# Patient Record
Sex: MALE | Race: BLACK OR AFRICAN AMERICAN | NOT HISPANIC OR LATINO | Employment: OTHER | URBAN - METROPOLITAN AREA
[De-identification: names, ages, dates, MRNs, and addresses within clinical notes are randomized per-mention and may not be internally consistent; named-entity substitution may affect disease eponyms.]

---

## 2024-05-06 ENCOUNTER — ANESTHESIA EVENT (OUTPATIENT)
Dept: SURGERY | Facility: OTHER | Age: 74
End: 2024-05-06
Payer: MEDICARE

## 2024-05-06 RX ORDER — LIDOCAINE HYDROCHLORIDE 10 MG/ML
0.5 INJECTION, SOLUTION EPIDURAL; INFILTRATION; INTRACAUDAL; PERINEURAL ONCE
Status: CANCELLED | OUTPATIENT
Start: 2024-05-06 | End: 2024-05-06

## 2024-05-06 RX ORDER — SODIUM CHLORIDE, SODIUM LACTATE, POTASSIUM CHLORIDE, CALCIUM CHLORIDE 600; 310; 30; 20 MG/100ML; MG/100ML; MG/100ML; MG/100ML
INJECTION, SOLUTION INTRAVENOUS CONTINUOUS
Status: CANCELLED | OUTPATIENT
Start: 2024-05-06

## 2024-05-06 RX ORDER — ACETAMINOPHEN 500 MG
1000 TABLET ORAL
Status: CANCELLED | OUTPATIENT
Start: 2024-05-06 | End: 2024-05-06

## 2024-05-09 ENCOUNTER — HOSPITAL ENCOUNTER (OUTPATIENT)
Dept: RADIOLOGY | Facility: OTHER | Age: 74
Discharge: HOME | End: 2024-05-09
Payer: MEDICARE

## 2024-05-09 ENCOUNTER — HOSPITAL ENCOUNTER (OUTPATIENT)
Dept: PREADMISSION TESTING | Facility: OTHER | Age: 74
Discharge: HOME | End: 2024-05-09
Attending: ORTHOPAEDIC SURGERY
Payer: MEDICARE

## 2024-05-09 VITALS
HEIGHT: 73 IN | DIASTOLIC BLOOD PRESSURE: 72 MMHG | SYSTOLIC BLOOD PRESSURE: 155 MMHG | WEIGHT: 195 LBS | TEMPERATURE: 98 F | BODY MASS INDEX: 25.84 KG/M2 | HEART RATE: 61 BPM | OXYGEN SATURATION: 98 % | RESPIRATION RATE: 18 BRPM

## 2024-05-09 DIAGNOSIS — Z01.818 PREOP TESTING: ICD-10-CM

## 2024-05-09 DIAGNOSIS — Z01.818 PRE-OP TESTING: Primary | ICD-10-CM

## 2024-05-09 LAB
ABO + RH BLD: NORMAL
ANION GAP SERPL CALC-SCNC: 11 MMOL/L (ref 8–16)
BASOPHILS # BLD AUTO: 0.03 K/UL (ref 0–0.2)
BASOPHILS NFR BLD: 0.5 % (ref 0–1.9)
BILIRUB UR QL STRIP: NEGATIVE
BLD GP AB SCN CELLS X3 SERPL QL: NORMAL
BUN SERPL-MCNC: 20 MG/DL (ref 8–23)
CALCIUM SERPL-MCNC: 9.3 MG/DL (ref 8.7–10.5)
CHLORIDE SERPL-SCNC: 102 MMOL/L (ref 95–110)
CLARITY UR: CLEAR
CO2 SERPL-SCNC: 22 MMOL/L (ref 23–29)
COLOR UR: YELLOW
CREAT SERPL-MCNC: 1.2 MG/DL (ref 0.5–1.4)
DIFFERENTIAL METHOD BLD: ABNORMAL
EOSINOPHIL # BLD AUTO: 0.1 K/UL (ref 0–0.5)
EOSINOPHIL NFR BLD: 1.2 % (ref 0–8)
ERYTHROCYTE [DISTWIDTH] IN BLOOD BY AUTOMATED COUNT: 14.8 % (ref 11.5–14.5)
EST. GFR  (NO RACE VARIABLE): >60 ML/MIN/1.73 M^2
GLUCOSE SERPL-MCNC: 78 MG/DL (ref 70–110)
GLUCOSE UR QL STRIP: NEGATIVE
HCT VFR BLD AUTO: 45.7 % (ref 40–54)
HGB BLD-MCNC: 14.9 G/DL (ref 14–18)
HGB UR QL STRIP: NEGATIVE
IMM GRANULOCYTES # BLD AUTO: 0.02 K/UL (ref 0–0.04)
IMM GRANULOCYTES NFR BLD AUTO: 0.3 % (ref 0–0.5)
KETONES UR QL STRIP: NEGATIVE
LEUKOCYTE ESTERASE UR QL STRIP: NEGATIVE
LYMPHOCYTES # BLD AUTO: 1.5 K/UL (ref 1–4.8)
LYMPHOCYTES NFR BLD: 23.3 % (ref 18–48)
MCH RBC QN AUTO: 27.8 PG (ref 27–31)
MCHC RBC AUTO-ENTMCNC: 32.6 G/DL (ref 32–36)
MCV RBC AUTO: 85 FL (ref 82–98)
MONOCYTES # BLD AUTO: 0.7 K/UL (ref 0.3–1)
MONOCYTES NFR BLD: 11.2 % (ref 4–15)
NEUTROPHILS # BLD AUTO: 4.2 K/UL (ref 1.8–7.7)
NEUTROPHILS NFR BLD: 63.5 % (ref 38–73)
NITRITE UR QL STRIP: NEGATIVE
NRBC BLD-RTO: 0 /100 WBC
PH UR STRIP: 7 [PH] (ref 5–8)
PLATELET # BLD AUTO: 300 K/UL (ref 150–450)
PMV BLD AUTO: 8.7 FL (ref 9.2–12.9)
POTASSIUM SERPL-SCNC: 3.9 MMOL/L (ref 3.5–5.1)
PROT UR QL STRIP: NEGATIVE
RBC # BLD AUTO: 5.36 M/UL (ref 4.6–6.2)
SODIUM SERPL-SCNC: 135 MMOL/L (ref 136–145)
SP GR UR STRIP: 1.02 (ref 1–1.03)
SPECIMEN OUTDATE: NORMAL
URN SPEC COLLECT METH UR: NORMAL
UROBILINOGEN UR STRIP-ACNC: NEGATIVE EU/DL
WBC # BLD AUTO: 6.6 K/UL (ref 3.9–12.7)

## 2024-05-09 PROCEDURE — 36415 COLL VENOUS BLD VENIPUNCTURE: CPT | Performed by: ORTHOPAEDIC SURGERY

## 2024-05-09 PROCEDURE — 81003 URINALYSIS AUTO W/O SCOPE: CPT | Performed by: ORTHOPAEDIC SURGERY

## 2024-05-09 PROCEDURE — 85025 COMPLETE CBC W/AUTO DIFF WBC: CPT | Performed by: ORTHOPAEDIC SURGERY

## 2024-05-09 PROCEDURE — 80048 BASIC METABOLIC PNL TOTAL CA: CPT | Performed by: ORTHOPAEDIC SURGERY

## 2024-05-09 PROCEDURE — 71046 X-RAY EXAM CHEST 2 VIEWS: CPT | Mod: 26,,, | Performed by: RADIOLOGY

## 2024-05-09 PROCEDURE — 71046 X-RAY EXAM CHEST 2 VIEWS: CPT | Mod: TC,FY

## 2024-05-09 PROCEDURE — 86901 BLOOD TYPING SEROLOGIC RH(D): CPT | Performed by: ORTHOPAEDIC SURGERY

## 2024-05-09 RX ORDER — LACTULOSE 10 G/15ML
10 SOLUTION ORAL; RECTAL 2 TIMES DAILY PRN
COMMUNITY

## 2024-05-09 RX ORDER — GABAPENTIN 300 MG/1
100 CAPSULE ORAL ONCE AS NEEDED
COMMUNITY
Start: 2024-01-02

## 2024-05-09 RX ORDER — LATANOPROST 50 UG/ML
1 SOLUTION/ DROPS OPHTHALMIC DAILY
COMMUNITY

## 2024-05-09 RX ORDER — ATORVASTATIN CALCIUM 20 MG/1
20 TABLET, FILM COATED ORAL DAILY
COMMUNITY

## 2024-05-09 RX ORDER — OXYBUTYNIN CHLORIDE 10 MG/1
10 TABLET, EXTENDED RELEASE ORAL DAILY
COMMUNITY
Start: 2023-08-21 | End: 2024-08-20

## 2024-05-09 RX ORDER — ERGOCALCIFEROL 1.25 MG/1
50000 CAPSULE ORAL ONCE AS NEEDED
COMMUNITY

## 2024-05-09 RX ORDER — FINASTERIDE 5 MG/1
5 TABLET, FILM COATED ORAL DAILY
COMMUNITY

## 2024-05-09 RX ORDER — LOSARTAN POTASSIUM AND HYDROCHLOROTHIAZIDE 100; 25 MG/1; MG/1
1 TABLET, FILM COATED ORAL DAILY
COMMUNITY

## 2024-05-09 NOTE — DISCHARGE INSTRUCTIONS
Information to Prepare you for your Surgery    PRE-ADMIT TESTING -  [phone]    [address]  [facility]          Your surgery has been scheduled at [facility]. We are pleased to have the opportunity to serve you. For Further Information please call [phone].    On the day of surgery please report to the Information Desk on the 1st floor.    CONTACT YOUR PHYSICIAN'S OFFICE THE DAY PRIOR TO YOUR SURGERY TO OBTAIN YOUR ARRIVAL TIME.     The evening before surgery do not eat anything after 9 p.m. ( this includes hard candy, chewing gum and mints).  You may only have GATORADE, POWERADE AND WATER  from 9 p.m. until you leave your home.   DO NOT DRINK ANY LIQUIDS ON THE WAY TO THE HOSPITAL.      Why does your anesthesiologist allow you to drink Gatorade/Powerade before surgery?  Gatorade/Powerade helps to increase your comfort before surgery and to decrease your nausea after surgery. The carbohydrates in Gatorade/Powerade help reduce your body's stress response to surgery.  If you are a diabetic-drink only water prior to surgery.    Outpatient Surgery- May allow 2 adult (18 and older) Support Persons (1 being the designated ) for all surgical/procedural patients. A breastfeeding mother will be allowed her infant and 2 adult Support Persons. No one under the age of 18 will be allowed in the building. No swapping out of visitors in the [facility].      SPECIAL MEDICATION INSTRUCTIONS: TAKE medications checked off by the Anesthesiologist on your Medication List.    Angiogram Patients: Take medications as instructed by your physician, including aspirin.     Surgery Patients:    If you take ASPIRIN - Your PHYSICIAN/SURGEON will need to inform you IF/OR when you need to stop taking aspirin prior to your surgery.     The week prior to surgery do not ot take any medications containing IBUPROFEN or NSAIDS ( Advil, Motrin, Goodys, BC, Aleve, Naproxen etc)  If you are not sure if you should take a medicine please call your surgeon's office.  Ok to take Tylenol    Do Not Wear any make-up (especially eye make-up) to surgery. Please remove any false eyelashes or eyelash extensions. If you arrive the day of surgery with makeup/eyelashes on you will be required to remove prior to surgery. (There is a risk of corneal abrasions if eye makeup/eyelash extensions are not removed)      Leave all valuables at home.   Do Not wear any jewelry or watches, including any metal in body piercings. Jewelry must be removed prior to coming to the hospital.  There is a possibility that rings that are unable to be removed may be cut off if they are on the surgical extremity.    Please remove all hair extensions, wigs, clips and any other metal accessories/ ornaments from your hair.  These items may pose a flammable/fire risk in Surgery and must be removed.    Do not shave your surgical area at least 5 days prior to your surgery. The surgical prep will be performed at the hospital according to Infection Control regulations.    Contact Lens must be removed before surgery. Either do not wear the contact lens or bring a case and solution for storage.  Please bring a container for eyeglasses or dentures as required.  Bring any paperwork your physician has provided, such as consent forms,  history and physicals, doctor's orders, etc.   Bring comfortable clothes that are loose fitting to wear upon discharge. Take into consideration the type of surgery being performed.  Maintain your diet as advised per your physician the day prior to surgery.      Adequate rest the night before surgery is advised.   Park in the Parking lot behind the hospital or in the [facility] across the street from the parking lot. Parking is complimentary.  If you will be discharged the same day as your procedure, please arrange for a responsible adult to drive you home or to accompany you if traveling by taxi.    YOU WILL NOT BE PERMITTED TO DRIVE OR TO LEAVE THE HOSPITAL ALONE AFTER SURGERY.   If you are being discharged the same day, it is strongly recommended that you arrange for someone to remain with you for the first 24 hrs following your surgery.    The Surgeon will speak to your family/visitor after your surgery regarding the outcome of your surgery and post op care.  The Surgeon may speak to you after your surgery, but there is a possibility you may not remember the details.  Please check with your family members regarding the conversation with the Surgeon.    We strongly recommend whoever is bringing you home be present for discharge instructions.  This will ensure a thorough understanding for your post op home care.          Thank you for your cooperation.  The Staff of [facility].            Bathing Instructions with Hibiclens    Shower the evening before and morning of your procedure with Chlorhexidine (Hibiclens)  do not use Chlorhexidine on your face or genitals. Do not get in your eyes.  Wash your face with water and your regular face wash/soap  Use your regular shampoo  Apply Chlorhexidine (Hibiclens) directly on your skin or on a wet washcloth and wash gently. When showering: Move away from the shower stream when applying Chlorhexidine (Hibiclens) to avoid rinsing off too soon.  Rinse thoroughly with warm water  Do not dilute Chlorhexidine (Hibiclens)   Dry off as usual, do not use any deodorant, powder, body lotions, perfume, after shave or cologne.                                     Information to Prepare you for your Surgery    PRE-ADMIT TESTING -  [phone]    [address] [facility]          Your surgery has been scheduled at [facility]. We are pleased to have the opportunity to serve you. For Further Information please call [phone].    On the day of surgery please report to the Information Desk on the 1st floor.    CONTACT YOUR  PHYSICIAN'S OFFICE THE DAY PRIOR TO YOUR SURGERY TO OBTAIN YOUR ARRIVAL TIME.     The evening before surgery do not eat anything after 9 p.m. ( this includes hard candy, chewing gum and mints).  You may only have GATORADE, POWERADE AND WATER  from 9 p.m. until you leave your home.   DO NOT DRINK ANY LIQUIDS ON THE WAY TO THE HOSPITAL.      Why does your anesthesiologist allow you to drink Gatorade/Powerade before surgery?  Gatorade/Powerade helps to increase your comfort before surgery and to decrease your nausea after surgery. The carbohydrates in Gatorade/Powerade help reduce your body's stress response to surgery.  If you are a diabetic-drink only water prior to surgery.    Outpatient Surgery- May allow 2 adult (18 and older) Support Persons (1 being the designated ) for all surgical/procedural patients. A breastfeeding mother will be allowed her infant and 2 adult Support Persons. No one under the age of 18 will be allowed in the building. No swapping out of visitors in the [place] building.      SPECIAL MEDICATION INSTRUCTIONS: TAKE medications checked off by the Anesthesiologist on your Medication List.    Angiogram Patients: Take medications as instructed by your physician, including aspirin.     Surgery Patients:    If you take ASPIRIN - Your PHYSICIAN/SURGEON will need to inform you IF/OR when you need to stop taking aspirin prior to your surgery.     The week prior to surgery do not ot take any medications containing IBUPROFEN or NSAIDS ( Advil, Motrin, Goodys, BC, Aleve, Naproxen etc) If you are not sure if you should take a medicine please call your surgeon's office.  Ok to take Tylenol    Do Not Wear any make-up (especially eye make-up) to surgery. Please remove any false eyelashes or eyelash extensions. If you arrive the day of surgery with makeup/eyelashes on you will be required to remove prior to surgery. (There is a risk of corneal abrasions if eye makeup/eyelash extensions are not  removed)      Leave all valuables at home.   Do Not wear any jewelry or watches, including any metal in body piercings. Jewelry must be removed prior to coming to the hospital.  There is a possibility that rings that are unable to be removed may be cut off if they are on the surgical extremity.    Please remove all hair extensions, wigs, clips and any other metal accessories/ ornaments from your hair.  These items may pose a flammable/fire risk in Surgery and must be removed.    Do not shave your surgical area at least 5 days prior to your surgery. The surgical prep will be performed at the hospital according to Infection Control regulations.    Contact Lens must be removed before surgery. Either do not wear the contact lens or bring a case and solution for storage.  Please bring a container for eyeglasses or dentures as required.  Bring any paperwork your physician has provided, such as consent forms,  history and physicals, doctor's orders, etc.   Bring comfortable clothes that are loose fitting to wear upon discharge. Take into consideration the type of surgery being performed.  Maintain your diet as advised per your physician the day prior to surgery.      Adequate rest the night before surgery is advised.   Park in the Parking lot behind the hospital or in the [place] across the street from the parking lot. Parking is complimentary.  If you will be discharged the same day as your procedure, please arrange for a responsible adult to drive you home or to accompany you if traveling by taxi.   YOU WILL NOT BE PERMITTED TO DRIVE OR TO LEAVE THE HOSPITAL ALONE AFTER SURGERY.   If you are being discharged the same day, it is strongly recommended that you arrange for someone to remain with you for the first 24 hrs following your surgery.    The Surgeon will speak to your family/visitor after your surgery regarding the outcome of your surgery and post op care.  The Surgeon may speak to you after your  surgery, but there is a possibility you may not remember the details.  Please check with your family members regarding the conversation with the Surgeon.    We strongly recommend whoever is bringing you home be present for discharge instructions.  This will ensure a thorough understanding for your post op home care.          Thank you for your cooperation.  The Staff of [facility].            Bathing Instructions with Hibiclens    Shower the evening before and morning of your procedure with Chlorhexidine (Hibiclens)  do not use Chlorhexidine on your face or genitals. Do not get in your eyes.  Wash your face with water and your regular face wash/soap  Use your regular shampoo  Apply Chlorhexidine (Hibiclens) directly on your skin or on a wet washcloth and wash gently. When showering: Move away from the shower stream when applying Chlorhexidine (Hibiclens) to avoid rinsing off too soon.  Rinse thoroughly with warm water  Do not dilute Chlorhexidine (Hibiclens)   Dry off as usual, do not use any deodorant, powder, body lotions, perfume, after shave or cologne.

## 2024-05-09 NOTE — PRE ADMISSION SCREENING
Dr. Curiel reviewed cardiac clearance and outside EKG.   Results placed in folder.  No new orders.

## 2024-05-09 NOTE — ANESTHESIA PREPROCEDURE EVALUATION
05/09/2024  Gage Hdez is a 74 y.o., male.      Pre-op Assessment    I have reviewed the Patient Summary Reports.     I have reviewed the Nursing Notes. I have reviewed the NPO Status.   I have reviewed the Medications.     Review of Systems  Anesthesia Hx:  No problems with previous Anesthesia             Denies Family Hx of Anesthesia complications.    Denies Personal Hx of Anesthesia complications.                    Social:  Non-Smoker       Hematology/Oncology:  Hematology Normal   Oncology Normal                                   EENT/Dental:  EENT/Dental Normal           Cardiovascular:     Hypertension                                        Pulmonary:  Pulmonary Normal                       Renal/:  Renal/ Normal                 Hepatic/GI:  Hepatic/GI Normal                 Musculoskeletal:  Musculoskeletal Normal                Neurological:  Neurology Normal                                      Endocrine:  Endocrine Normal            Dermatological:  Skin Normal    Psych:  Psychiatric Normal                    Physical Exam  General: Well nourished and Alert    Airway:  Mallampati: II   Mouth Opening: Normal  Tongue: Normal    Dental:  Dentures        Anesthesia Plan  Type of Anesthesia, risks & benefits discussed:    Anesthesia Type: Gen ETT  Intra-op Monitoring Plan: Standard ASA Monitors  Post Op Pain Control Plan: multimodal analgesia  Induction:  IV  Airway Plan: Video  Informed Consent: Informed consent signed with the Patient and all parties understand the risks and agree with anesthesia plan.  All questions answered.   ASA Score: 2  Anesthesia Plan Notes: Paper clearance Dr Parks- Paper EKG  Labs pending    Ready For Surgery From Anesthesia Perspective.     .

## 2024-05-16 ENCOUNTER — HOSPITAL ENCOUNTER (OUTPATIENT)
Facility: OTHER | Age: 74
LOS: 1 days | Discharge: HOME | End: 2024-05-20
Attending: ORTHOPAEDIC SURGERY | Admitting: ORTHOPAEDIC SURGERY
Payer: MEDICARE

## 2024-05-16 ENCOUNTER — ANESTHESIA (OUTPATIENT)
Dept: SURGERY | Facility: OTHER | Age: 74
End: 2024-05-16
Payer: MEDICARE

## 2024-05-16 DIAGNOSIS — M51.26 DISPLACEMENT OF LUMBAR INTERVERTEBRAL DISC WITHOUT MYELOPATHY: ICD-10-CM

## 2024-05-16 DIAGNOSIS — Z01.818 PREOP TESTING: Primary | ICD-10-CM

## 2024-05-16 LAB
ANION GAP SERPL CALC-SCNC: 11 MMOL/L (ref 8–16)
APTT PPP: 29.6 SEC (ref 21–32)
BASOPHILS # BLD AUTO: 0.01 K/UL (ref 0–0.2)
BASOPHILS NFR BLD: 0.1 % (ref 0–1.9)
BUN SERPL-MCNC: 13 MG/DL (ref 8–23)
CALCIUM SERPL-MCNC: 9 MG/DL (ref 8.7–10.5)
CHLORIDE SERPL-SCNC: 102 MMOL/L (ref 95–110)
CO2 SERPL-SCNC: 21 MMOL/L (ref 23–29)
CREAT SERPL-MCNC: 1.2 MG/DL (ref 0.5–1.4)
DIFFERENTIAL METHOD BLD: ABNORMAL
EOSINOPHIL # BLD AUTO: 0 K/UL (ref 0–0.5)
EOSINOPHIL NFR BLD: 0 % (ref 0–8)
ERYTHROCYTE [DISTWIDTH] IN BLOOD BY AUTOMATED COUNT: 14.8 % (ref 11.5–14.5)
EST. GFR  (NO RACE VARIABLE): >60 ML/MIN/1.73 M^2
GLUCOSE SERPL-MCNC: 122 MG/DL (ref 70–110)
HCT VFR BLD AUTO: 41.8 % (ref 40–54)
HGB BLD-MCNC: 13.8 G/DL (ref 14–18)
IMM GRANULOCYTES # BLD AUTO: 0.07 K/UL (ref 0–0.04)
IMM GRANULOCYTES NFR BLD AUTO: 0.5 % (ref 0–0.5)
INR PPP: 0.9 (ref 0.8–1.2)
LYMPHOCYTES # BLD AUTO: 0.7 K/UL (ref 1–4.8)
LYMPHOCYTES NFR BLD: 5.8 % (ref 18–48)
MCH RBC QN AUTO: 27.8 PG (ref 27–31)
MCHC RBC AUTO-ENTMCNC: 33 G/DL (ref 32–36)
MCV RBC AUTO: 84 FL (ref 82–98)
MONOCYTES # BLD AUTO: 0.4 K/UL (ref 0.3–1)
MONOCYTES NFR BLD: 2.8 % (ref 4–15)
NEUTROPHILS # BLD AUTO: 11.6 K/UL (ref 1.8–7.7)
NEUTROPHILS NFR BLD: 90.8 % (ref 38–73)
NRBC BLD-RTO: 0 /100 WBC
PLATELET # BLD AUTO: 257 K/UL (ref 150–450)
PMV BLD AUTO: 8.5 FL (ref 9.2–12.9)
POTASSIUM SERPL-SCNC: 3.9 MMOL/L (ref 3.5–5.1)
PROTHROMBIN TIME: 10.7 SEC (ref 9–12.5)
RBC # BLD AUTO: 4.97 M/UL (ref 4.6–6.2)
SODIUM SERPL-SCNC: 134 MMOL/L (ref 136–145)
WBC # BLD AUTO: 12.73 K/UL (ref 3.9–12.7)

## 2024-05-16 PROCEDURE — 85025 COMPLETE CBC W/AUTO DIFF WBC: CPT | Performed by: ORTHOPAEDIC SURGERY

## 2024-05-16 PROCEDURE — 25000003 PHARM REV CODE 250: Performed by: NURSE ANESTHETIST, CERTIFIED REGISTERED

## 2024-05-16 PROCEDURE — 63600175 PHARM REV CODE 636 W HCPCS: Performed by: ANESTHESIOLOGY

## 2024-05-16 PROCEDURE — 36000711: Performed by: ORTHOPAEDIC SURGERY

## 2024-05-16 PROCEDURE — 25000003 PHARM REV CODE 250: Performed by: ORTHOPAEDIC SURGERY

## 2024-05-16 PROCEDURE — 85610 PROTHROMBIN TIME: CPT | Performed by: ORTHOPAEDIC SURGERY

## 2024-05-16 PROCEDURE — 25000003 PHARM REV CODE 250: Performed by: ANESTHESIOLOGY

## 2024-05-16 PROCEDURE — 36415 COLL VENOUS BLD VENIPUNCTURE: CPT | Performed by: ORTHOPAEDIC SURGERY

## 2024-05-16 PROCEDURE — 37000009 HC ANESTHESIA EA ADD 15 MINS: Performed by: ORTHOPAEDIC SURGERY

## 2024-05-16 PROCEDURE — 27201423 OPTIME MED/SURG SUP & DEVICES STERILE SUPPLY: Performed by: ORTHOPAEDIC SURGERY

## 2024-05-16 PROCEDURE — 80048 BASIC METABOLIC PNL TOTAL CA: CPT | Performed by: ORTHOPAEDIC SURGERY

## 2024-05-16 PROCEDURE — 37000008 HC ANESTHESIA 1ST 15 MINUTES: Performed by: ORTHOPAEDIC SURGERY

## 2024-05-16 PROCEDURE — 27800903 OPTIME MED/SURG SUP & DEVICES OTHER IMPLANTS: Performed by: ORTHOPAEDIC SURGERY

## 2024-05-16 PROCEDURE — 71000039 HC RECOVERY, EACH ADD'L HOUR: Performed by: ORTHOPAEDIC SURGERY

## 2024-05-16 PROCEDURE — 63600175 PHARM REV CODE 636 W HCPCS: Performed by: ORTHOPAEDIC SURGERY

## 2024-05-16 PROCEDURE — 71000033 HC RECOVERY, INTIAL HOUR: Performed by: ORTHOPAEDIC SURGERY

## 2024-05-16 PROCEDURE — 85730 THROMBOPLASTIN TIME PARTIAL: CPT | Performed by: ORTHOPAEDIC SURGERY

## 2024-05-16 PROCEDURE — D9220A PRA ANESTHESIA: Mod: AD,P2,ANES, | Performed by: ANESTHESIOLOGY

## 2024-05-16 PROCEDURE — D9220A PRA ANESTHESIA: Mod: CRNA,,, | Performed by: NURSE ANESTHETIST, CERTIFIED REGISTERED

## 2024-05-16 PROCEDURE — C1762 CONN TISS, HUMAN(INC FASCIA): HCPCS | Performed by: ORTHOPAEDIC SURGERY

## 2024-05-16 PROCEDURE — 94799 UNLISTED PULMONARY SVC/PX: CPT

## 2024-05-16 PROCEDURE — C1713 ANCHOR/SCREW BN/BN,TIS/BN: HCPCS | Performed by: ORTHOPAEDIC SURGERY

## 2024-05-16 PROCEDURE — 94761 N-INVAS EAR/PLS OXIMETRY MLT: CPT

## 2024-05-16 PROCEDURE — 36000710: Performed by: ORTHOPAEDIC SURGERY

## 2024-05-16 PROCEDURE — 63600175 PHARM REV CODE 636 W HCPCS: Performed by: NURSE ANESTHETIST, CERTIFIED REGISTERED

## 2024-05-16 DEVICE — IMPLANTABLE DEVICE: Type: IMPLANTABLE DEVICE | Site: SPINE LUMBAR | Status: FUNCTIONAL

## 2024-05-16 RX ORDER — OXYCODONE AND ACETAMINOPHEN 10; 325 MG/1; MG/1
1 TABLET ORAL EVERY 4 HOURS PRN
Status: DISCONTINUED | OUTPATIENT
Start: 2024-05-16 | End: 2024-05-20 | Stop reason: HOSPADM

## 2024-05-16 RX ORDER — METOCLOPRAMIDE HYDROCHLORIDE 5 MG/ML
5 INJECTION INTRAMUSCULAR; INTRAVENOUS EVERY 6 HOURS PRN
Status: DISCONTINUED | OUTPATIENT
Start: 2024-05-16 | End: 2024-05-20 | Stop reason: HOSPADM

## 2024-05-16 RX ORDER — PROPOFOL 10 MG/ML
VIAL (ML) INTRAVENOUS
Status: DISCONTINUED | OUTPATIENT
Start: 2024-05-16 | End: 2024-05-16

## 2024-05-16 RX ORDER — LACTULOSE 10 G/15ML
10 SOLUTION ORAL 2 TIMES DAILY PRN
Status: DISCONTINUED | OUTPATIENT
Start: 2024-05-16 | End: 2024-05-20 | Stop reason: HOSPADM

## 2024-05-16 RX ORDER — OXYCODONE AND ACETAMINOPHEN 10; 325 MG/1; MG/1
2 TABLET ORAL EVERY 4 HOURS PRN
Status: DISCONTINUED | OUTPATIENT
Start: 2024-05-16 | End: 2024-05-20 | Stop reason: HOSPADM

## 2024-05-16 RX ORDER — CYCLOBENZAPRINE HCL 5 MG
10 TABLET ORAL 3 TIMES DAILY PRN
Status: DISCONTINUED | OUTPATIENT
Start: 2024-05-16 | End: 2024-05-20 | Stop reason: HOSPADM

## 2024-05-16 RX ORDER — VANCOMYCIN HYDROCHLORIDE 1 G/20ML
INJECTION, POWDER, LYOPHILIZED, FOR SOLUTION INTRAVENOUS
Status: DISCONTINUED | OUTPATIENT
Start: 2024-05-16 | End: 2024-05-16 | Stop reason: HOSPADM

## 2024-05-16 RX ORDER — SODIUM CHLORIDE, SODIUM LACTATE, POTASSIUM CHLORIDE, CALCIUM CHLORIDE 600; 310; 30; 20 MG/100ML; MG/100ML; MG/100ML; MG/100ML
INJECTION, SOLUTION INTRAVENOUS CONTINUOUS
Status: DISCONTINUED | OUTPATIENT
Start: 2024-05-16 | End: 2024-05-17

## 2024-05-16 RX ORDER — POLYETHYLENE GLYCOL 3350 17 G/17G
17 POWDER, FOR SOLUTION ORAL DAILY
Status: DISCONTINUED | OUTPATIENT
Start: 2024-05-17 | End: 2024-05-20 | Stop reason: HOSPADM

## 2024-05-16 RX ORDER — VECURONIUM BROMIDE FOR INJECTION 1 MG/ML
INJECTION, POWDER, LYOPHILIZED, FOR SOLUTION INTRAVENOUS
Status: DISCONTINUED | OUTPATIENT
Start: 2024-05-16 | End: 2024-05-16

## 2024-05-16 RX ORDER — ROCURONIUM BROMIDE 10 MG/ML
INJECTION, SOLUTION INTRAVENOUS
Status: DISCONTINUED | OUTPATIENT
Start: 2024-05-16 | End: 2024-05-16

## 2024-05-16 RX ORDER — LIDOCAINE HYDROCHLORIDE 20 MG/ML
INJECTION INTRAVENOUS
Status: DISCONTINUED | OUTPATIENT
Start: 2024-05-16 | End: 2024-05-16

## 2024-05-16 RX ORDER — TRANEXAMIC ACID 100 MG/ML
INJECTION, SOLUTION INTRAVENOUS
Status: DISCONTINUED | OUTPATIENT
Start: 2024-05-16 | End: 2024-05-16

## 2024-05-16 RX ORDER — CEFAZOLIN SODIUM 1 G/3ML
2 INJECTION, POWDER, FOR SOLUTION INTRAMUSCULAR; INTRAVENOUS
Status: COMPLETED | OUTPATIENT
Start: 2024-05-16 | End: 2024-05-16

## 2024-05-16 RX ORDER — HYDROMORPHONE HYDROCHLORIDE 2 MG/ML
INJECTION, SOLUTION INTRAMUSCULAR; INTRAVENOUS; SUBCUTANEOUS
Status: DISCONTINUED | OUTPATIENT
Start: 2024-05-16 | End: 2024-05-16

## 2024-05-16 RX ORDER — GABAPENTIN 100 MG/1
100 CAPSULE ORAL ONCE AS NEEDED
Status: DISCONTINUED | OUTPATIENT
Start: 2024-05-16 | End: 2024-05-20 | Stop reason: HOSPADM

## 2024-05-16 RX ORDER — AMOXICILLIN 250 MG
1 CAPSULE ORAL 2 TIMES DAILY
Status: DISCONTINUED | OUTPATIENT
Start: 2024-05-16 | End: 2024-05-20 | Stop reason: HOSPADM

## 2024-05-16 RX ORDER — PHENYLEPHRINE HYDROCHLORIDE 10 MG/ML
INJECTION INTRAVENOUS
Status: DISCONTINUED | OUTPATIENT
Start: 2024-05-16 | End: 2024-05-16

## 2024-05-16 RX ORDER — SODIUM CHLORIDE, SODIUM LACTATE, POTASSIUM CHLORIDE, CALCIUM CHLORIDE 600; 310; 30; 20 MG/100ML; MG/100ML; MG/100ML; MG/100ML
INJECTION, SOLUTION INTRAVENOUS CONTINUOUS
Status: DISCONTINUED | OUTPATIENT
Start: 2024-05-16 | End: 2024-05-16

## 2024-05-16 RX ORDER — FAMOTIDINE 20 MG/1
20 TABLET, FILM COATED ORAL 2 TIMES DAILY
Status: DISCONTINUED | OUTPATIENT
Start: 2024-05-16 | End: 2024-05-20 | Stop reason: HOSPADM

## 2024-05-16 RX ORDER — ONDANSETRON HYDROCHLORIDE 2 MG/ML
INJECTION, SOLUTION INTRAMUSCULAR; INTRAVENOUS
Status: DISCONTINUED | OUTPATIENT
Start: 2024-05-16 | End: 2024-05-16

## 2024-05-16 RX ORDER — MUPIROCIN 20 MG/G
OINTMENT TOPICAL 2 TIMES DAILY
Status: COMPLETED | OUTPATIENT
Start: 2024-05-16 | End: 2024-05-18

## 2024-05-16 RX ORDER — MEPERIDINE HYDROCHLORIDE 25 MG/ML
12.5 INJECTION INTRAMUSCULAR; INTRAVENOUS; SUBCUTANEOUS ONCE AS NEEDED
Status: DISCONTINUED | OUTPATIENT
Start: 2024-05-16 | End: 2024-05-16 | Stop reason: HOSPADM

## 2024-05-16 RX ORDER — OXYCODONE HYDROCHLORIDE 5 MG/1
5 TABLET ORAL
Status: DISCONTINUED | OUTPATIENT
Start: 2024-05-16 | End: 2024-05-16 | Stop reason: HOSPADM

## 2024-05-16 RX ORDER — LOPERAMIDE HYDROCHLORIDE 2 MG/1
4 CAPSULE ORAL ONCE
Status: COMPLETED | OUTPATIENT
Start: 2024-05-16 | End: 2024-05-16

## 2024-05-16 RX ORDER — FINASTERIDE 5 MG/1
5 TABLET, FILM COATED ORAL DAILY
Status: DISCONTINUED | OUTPATIENT
Start: 2024-05-17 | End: 2024-05-20 | Stop reason: HOSPADM

## 2024-05-16 RX ORDER — ONDANSETRON 8 MG/1
8 TABLET, ORALLY DISINTEGRATING ORAL EVERY 8 HOURS PRN
Status: DISCONTINUED | OUTPATIENT
Start: 2024-05-16 | End: 2024-05-20 | Stop reason: HOSPADM

## 2024-05-16 RX ORDER — SODIUM CHLORIDE 0.9 % (FLUSH) 0.9 %
3 SYRINGE (ML) INJECTION
Status: DISCONTINUED | OUTPATIENT
Start: 2024-05-16 | End: 2024-05-16 | Stop reason: HOSPADM

## 2024-05-16 RX ORDER — ACETAMINOPHEN 500 MG
1000 TABLET ORAL
Status: COMPLETED | OUTPATIENT
Start: 2024-05-16 | End: 2024-05-16

## 2024-05-16 RX ORDER — PROCHLORPERAZINE EDISYLATE 5 MG/ML
5 INJECTION INTRAMUSCULAR; INTRAVENOUS EVERY 30 MIN PRN
Status: DISCONTINUED | OUTPATIENT
Start: 2024-05-16 | End: 2024-05-16 | Stop reason: HOSPADM

## 2024-05-16 RX ORDER — DEXAMETHASONE SODIUM PHOSPHATE 4 MG/ML
INJECTION, SOLUTION INTRA-ARTICULAR; INTRALESIONAL; INTRAMUSCULAR; INTRAVENOUS; SOFT TISSUE
Status: DISCONTINUED | OUTPATIENT
Start: 2024-05-16 | End: 2024-05-16

## 2024-05-16 RX ORDER — LATANOPROST 50 UG/ML
1 SOLUTION/ DROPS OPHTHALMIC DAILY
Status: DISCONTINUED | OUTPATIENT
Start: 2024-05-17 | End: 2024-05-20 | Stop reason: HOSPADM

## 2024-05-16 RX ORDER — HYDROMORPHONE HYDROCHLORIDE 2 MG/ML
0.4 INJECTION, SOLUTION INTRAMUSCULAR; INTRAVENOUS; SUBCUTANEOUS EVERY 5 MIN PRN
Status: DISCONTINUED | OUTPATIENT
Start: 2024-05-16 | End: 2024-05-16 | Stop reason: HOSPADM

## 2024-05-16 RX ORDER — LOPERAMIDE HYDROCHLORIDE 2 MG/1
2 CAPSULE ORAL CONTINUOUS PRN
Status: DISCONTINUED | OUTPATIENT
Start: 2024-05-16 | End: 2024-05-20 | Stop reason: HOSPADM

## 2024-05-16 RX ORDER — ATORVASTATIN CALCIUM 20 MG/1
20 TABLET, FILM COATED ORAL DAILY
Status: DISCONTINUED | OUTPATIENT
Start: 2024-05-17 | End: 2024-05-20 | Stop reason: HOSPADM

## 2024-05-16 RX ORDER — PHENYLEPHRINE HYDROCHLORIDE 10 MG/ML
INJECTION INTRAVENOUS CONTINUOUS PRN
Status: DISCONTINUED | OUTPATIENT
Start: 2024-05-16 | End: 2024-05-16

## 2024-05-16 RX ORDER — HYDROMORPHONE HYDROCHLORIDE 2 MG/ML
0.5 INJECTION, SOLUTION INTRAMUSCULAR; INTRAVENOUS; SUBCUTANEOUS
Status: DISCONTINUED | OUTPATIENT
Start: 2024-05-16 | End: 2024-05-20 | Stop reason: HOSPADM

## 2024-05-16 RX ORDER — OXYBUTYNIN CHLORIDE 5 MG/1
10 TABLET, EXTENDED RELEASE ORAL DAILY
Status: DISCONTINUED | OUTPATIENT
Start: 2024-05-17 | End: 2024-05-20 | Stop reason: HOSPADM

## 2024-05-16 RX ORDER — LIDOCAINE HYDROCHLORIDE 10 MG/ML
0.5 INJECTION, SOLUTION EPIDURAL; INFILTRATION; INTRACAUDAL; PERINEURAL ONCE
Status: DISCONTINUED | OUTPATIENT
Start: 2024-05-16 | End: 2024-05-16

## 2024-05-16 RX ORDER — FENTANYL CITRATE 50 UG/ML
INJECTION, SOLUTION INTRAMUSCULAR; INTRAVENOUS
Status: DISCONTINUED | OUTPATIENT
Start: 2024-05-16 | End: 2024-05-16

## 2024-05-16 RX ADMIN — LOPERAMIDE HYDROCHLORIDE 4 MG: 2 CAPSULE ORAL at 08:05

## 2024-05-16 RX ADMIN — SENNOSIDES AND DOCUSATE SODIUM 1 TABLET: 8.6; 5 TABLET ORAL at 08:05

## 2024-05-16 RX ADMIN — TRANEXAMIC ACID 1000 MG: 100 INJECTION, SOLUTION INTRAVENOUS at 12:05

## 2024-05-16 RX ADMIN — CEFAZOLIN 2 G: 2 INJECTION, POWDER, FOR SOLUTION INTRAMUSCULAR; INTRAVENOUS at 08:05

## 2024-05-16 RX ADMIN — GLYCOPYRROLATE 0.2 MG: 0.2 INJECTION, SOLUTION INTRAMUSCULAR; INTRAVITREAL at 12:05

## 2024-05-16 RX ADMIN — CARBOXYMETHYLCELLULOSE SODIUM 4 DROP: 2.5 SOLUTION/ DROPS OPHTHALMIC at 12:05

## 2024-05-16 RX ADMIN — CYCLOBENZAPRINE HYDROCHLORIDE 10 MG: 5 TABLET, FILM COATED ORAL at 08:05

## 2024-05-16 RX ADMIN — TRANEXAMIC ACID 1000 MG: 100 INJECTION, SOLUTION INTRAVENOUS at 01:05

## 2024-05-16 RX ADMIN — LIDOCAINE HYDROCHLORIDE 75 MG: 20 INJECTION, SOLUTION INTRAVENOUS at 12:05

## 2024-05-16 RX ADMIN — ONDANSETRON 4 MG: 2 INJECTION INTRAMUSCULAR; INTRAVENOUS at 03:05

## 2024-05-16 RX ADMIN — VECURONIUM BROMIDE FOR INJECTION 2 MG: 1 INJECTION, POWDER, LYOPHILIZED, FOR SOLUTION INTRAVENOUS at 12:05

## 2024-05-16 RX ADMIN — DEXAMETHASONE SODIUM PHOSPHATE 8 MG: 4 INJECTION, SOLUTION INTRAMUSCULAR; INTRAVENOUS at 12:05

## 2024-05-16 RX ADMIN — MUPIROCIN: 20 OINTMENT TOPICAL at 09:05

## 2024-05-16 RX ADMIN — SODIUM CHLORIDE, SODIUM LACTATE, POTASSIUM CHLORIDE, AND CALCIUM CHLORIDE: 600; 310; 30; 20 INJECTION, SOLUTION INTRAVENOUS at 01:05

## 2024-05-16 RX ADMIN — ACETAMINOPHEN 1000 MG: 500 TABLET ORAL at 10:05

## 2024-05-16 RX ADMIN — OXYCODONE AND ACETAMINOPHEN 1 TABLET: 10; 325 TABLET ORAL at 09:05

## 2024-05-16 RX ADMIN — PHENYLEPHRINE HYDROCHLORIDE 200 MCG: 10 INJECTION INTRAVENOUS at 12:05

## 2024-05-16 RX ADMIN — CEFAZOLIN 2 G: 330 INJECTION, POWDER, FOR SOLUTION INTRAMUSCULAR; INTRAVENOUS at 12:05

## 2024-05-16 RX ADMIN — SODIUM CHLORIDE, POTASSIUM CHLORIDE, SODIUM LACTATE AND CALCIUM CHLORIDE: 600; 310; 30; 20 INJECTION, SOLUTION INTRAVENOUS at 06:05

## 2024-05-16 RX ADMIN — SODIUM CHLORIDE, SODIUM LACTATE, POTASSIUM CHLORIDE, AND CALCIUM CHLORIDE: 600; 310; 30; 20 INJECTION, SOLUTION INTRAVENOUS at 11:05

## 2024-05-16 RX ADMIN — OXYCODONE HYDROCHLORIDE 5 MG: 5 TABLET ORAL at 05:05

## 2024-05-16 RX ADMIN — HYDROMORPHONE HYDROCHLORIDE 0.4 MG: 2 INJECTION INTRAMUSCULAR; INTRAVENOUS; SUBCUTANEOUS at 03:05

## 2024-05-16 RX ADMIN — HYDROMORPHONE HYDROCHLORIDE 0.4 MG: 2 INJECTION INTRAMUSCULAR; INTRAVENOUS; SUBCUTANEOUS at 02:05

## 2024-05-16 RX ADMIN — FAMOTIDINE 20 MG: 20 TABLET ORAL at 08:05

## 2024-05-16 RX ADMIN — PROPOFOL 200 MG: 10 INJECTION, EMULSION INTRAVENOUS at 12:05

## 2024-05-16 RX ADMIN — VECURONIUM BROMIDE FOR INJECTION 3 MG: 1 INJECTION, POWDER, LYOPHILIZED, FOR SOLUTION INTRAVENOUS at 12:05

## 2024-05-16 RX ADMIN — FENTANYL CITRATE 100 MCG: 50 INJECTION, SOLUTION INTRAMUSCULAR; INTRAVENOUS at 12:05

## 2024-05-16 RX ADMIN — PHENYLEPHRINE HYDROCHLORIDE 0.3 MCG/KG/MIN: 10 INJECTION INTRAVENOUS at 12:05

## 2024-05-16 RX ADMIN — SUGAMMADEX 200 MG: 100 INJECTION, SOLUTION INTRAVENOUS at 04:05

## 2024-05-16 RX ADMIN — ROCURONIUM BROMIDE 50 MG: 10 SOLUTION INTRAVENOUS at 12:05

## 2024-05-16 NOTE — TRANSFER OF CARE
Anesthesia Transfer of Care Note    Patient: Gage Hdez    Procedure(s) Performed: Procedure(s) (LRB):  FUSION, SPINE, LUMBAR OPEN POSTERIOR L4-L5 (N/A)    Patient location: PACU    Anesthesia Type: general    Transport from OR: Transported from OR on 6-10 L/min O2 by face mask with adequate spontaneous ventilation    Post pain: adequate analgesia    Post assessment: no apparent anesthetic complications and tolerated procedure well    Post vital signs: stable    Level of consciousness: awake and responds to stimulation    Nausea/Vomiting: no nausea/vomiting    Complications: none    Transfer of care protocol was followed      Last vitals: Visit Vitals  BP (!) 148/62   Temp 36.5 °C (97.7 °F)

## 2024-05-16 NOTE — ANESTHESIA PROCEDURE NOTES
Intubation    Date/Time: 5/16/2024 12:08 PM    Performed by: Chayo Andrade CRNA  Authorized by: Antonio Curiel MD    Intubation:     Induction:  Intravenous    Intubated:  Postinduction    Mask Ventilation:  Easy mask    Attempts:  1    Attempted By:  CRNA    Method of Intubation:  Video laryngoscopy    Blade:  Loomis 3    Laryngeal View Grade: Grade I - full view of cords      Difficult Airway Encountered?: No      Complications:  None    Airway Device:  Oral endotracheal tube    Airway Device Size:  7.5    Style/Cuff Inflation:  Cuffed    Inflation Amount (mL):  4    Tube secured:  21    Secured at:  The lips    Placement Verified By:  Capnometry    Complicating Factors:  None    Findings Post-Intubation:  BS equal bilateral

## 2024-05-16 NOTE — OR NURSING
VSS on 2L NC. Pain is tolerable per pt. Back dressing, CDI. Accordion drain in place. PIV CDI. Meets PACU discharge criteria. Ready for transfer to [address] [place] [address]. Report given to Liliana. Family notified.

## 2024-05-16 NOTE — INTERVAL H&P NOTE
The patient has been examined and the H&P has been reviewed:    I concur with the findings and changes have been noted since the H&P was written: patient CTA and RRR    Anesthesia/Surgery risks, benefits and alternative options discussed and understood by patient/family.          There are no hospital problems to display for this patient.

## 2024-05-16 NOTE — OP NOTE
[facility] ([facility]  Surgery Department  Operative Note    SUMMARY     Date of Procedure: 5/16/2024     Procedure:   1.  Posterior spinal fusion with interbody fusion L4-5  2.  Application of biomechanical intervertebral device L4-5  3.  Nonsegmental instrumentation L4-5  4.  Laminectomy L4  5.  Laminectomy L5  6.  Autograft morselized for spine surgery same incision  7.  Allograft morcellized for spine surgery     Surgeons and Role:     * Maverick Sullivan MD - Primary    Assistant::  Leila Voss p.a.-C, Ms. 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  The site of her surgery was marked by the surgeon consent form was verified.  Preoperative antibiotics were given and SCDs and Aravind hose were placed.  The appropriate anesthesia and neurological monitoring devices were placed.  Patient was then brought to the operating room placed under general endotracheal anesthesia.  A Jin catheter was placed and the appropriate neurological monitoring devices placed.  The patient was then flipped prone onto an open Goran frame with all extremities  appropriately padded and positioned.  The back was then prepped and draped in usual sterile fashion.  A time-out was performed antibiotics were verified as having been given.  Please note the entirety of the procedure was carried out using loupe magnification, bipolar electrocautery, and headlight illumination.  A midline incision was made over the lower portion of the lumbar spine.  It was carried down to the fascia which was incised in line with the skin incision.  The posterior elements were then exposed from L3-L5.  A clamp was placed on the L4 posterior spinous process and the lateral x-ray taken to verify the level.  The transverse processes of L4 and L5 were then exposed the appropriate retractors placed.  Attention was then brought to performing laminectomy.  A Leksell rongeur was used to remove used to remove the posterior spinous process of L4 part of L5 and part of L3.  It was all was used to thin the lamina of L4.  A Kerrison punch was then used to remove the entire mid line laminectomy at the L4 level.  Decompression was then carried out the lateral recess decompressing from the pedicle of L3 to the pedicle of L5 by using osteotomes to perform medial 3rd facetectomies and completing the lateral recess decompression and foraminotomies with Kerrison punches.  A ball-tip probe verified was full and complete decompression.  An extruded fragment of disc was noted to extend upwards behind the body of L4 and this was removed.  On the left side and osteotomy was then made through the pars and the inferior articular facet removed to allow access to the disc space.  The superior articular facet was removed in a piecemeal fashion.  With the nerve root well protected and annulotomy was performed the disc was removed in a piecemeal fashion to the bleeding cortical bone was noted on both sides of the endplates.  Various rasps Davey curette and pituitaries were used for this.  The bone had been removed during the  course of the decompression was stripped of soft tissue and meticulously morselized in a bone mill.  It was combined with the allograft bone.  20 cc of iliac crest aspirate was then obtained with a Jamshidi needle and mixed with the autograft and allograft.  The disc space then had a final placed and was packed fully with autograft and allograft.  The cage was packed with same and placed under direct visualization and expanded under fluoroscopic guidance.  Excellent fit was noted.  Attention was then brought to placement of the pedicle screws.  On the left side of the junction of the transverse process superior articular facet and the pars at the L4 level the bur was used to make a small cortical window.  The lanky probe was then passed down the pedicle and removed.  All 4 walls and the distal floor were palpated and felt to be intact.  The appropriate sized tap was placed and removed and again the walls and floor were palpated and felt to be intact.  The appropriate size screw was then placed.  This was done on the left at L4 and L5 and then on the right at L4 and L5.  All screws stimulated well above the level indicative of any kind of cortical breech and x-ray verified they were in excellent position.  The appropriate size rods were then selected and and end caps placed and compression performed to prevent injection of the cage.  Final tightening of the devices were performed.  The wound was then irrigated copiously.  The high-speed bur was then used to decorticate the remaining posterior elements and bone graft placed posterolaterally to achieve the posterior fusion.  The spinal canal was inspected to assure there was full and complete decompression of the nerve roots at the L4 and L5 levels.  A deep drain was then placed.  This fascia and skin were injected with Exparel and closed using 1 Vicryl suture followed by 2-0 Vicryl suture to close the skin and a running Monocryl stitch.  Steri-Strips and sterile  dressing were then placed.  Patient was placed back into a supine position and general endotracheal anesthesia discontinued.  She was then brought to the recovery room without complication.  All needle, sponge, lap and instrument counts were correct.  There were no complications.    Significant Surgical Tasks Conducted by the Assistant(s), if Applicable: ms Voss was essential for exposure closure visualization and placement of instrumentation throughout the operation    Estimated Blood Loss (EBL): * No values recorded between 5/16/2024 12:31 PM and 5/16/2024  4:13 PM *           Implants:   Implant Name Type Inv. Item Serial No.  Lot No. LRB No. Used Action   [id number]   [id number] Spinal Elements [id number]  1 Implanted   [id number]   [id number] Spinal Elements [id number]  1 Implanted   6.5X45MM SCREW    [employer]  N/A 4 Implanted   SET SCREW    [employer]  N/A 4 Implanted   9MM LUCENT XPAND    Spinal Elements  N/A 1 Implanted   50MM SHAYLEE      N/A 2 Implanted       Specimens:   Specimen (24h ago, onward)      None                    Condition: Good    Disposition: PACU - hemodynamically stable.    Attestation: Op Note Attestation: I was physically present and scrubbed for the entire procedure.

## 2024-05-17 LAB
BASOPHILS # BLD AUTO: 0.02 K/UL (ref 0–0.2)
BASOPHILS NFR BLD: 0.2 % (ref 0–1.9)
DIFFERENTIAL METHOD BLD: ABNORMAL
EOSINOPHIL # BLD AUTO: 0 K/UL (ref 0–0.5)
EOSINOPHIL NFR BLD: 0 % (ref 0–8)
ERYTHROCYTE [DISTWIDTH] IN BLOOD BY AUTOMATED COUNT: 14.8 % (ref 11.5–14.5)
HCT VFR BLD AUTO: 39.2 % (ref 40–54)
HGB BLD-MCNC: 12.8 G/DL (ref 14–18)
IMM GRANULOCYTES # BLD AUTO: 0.05 K/UL (ref 0–0.04)
IMM GRANULOCYTES NFR BLD AUTO: 0.4 % (ref 0–0.5)
LYMPHOCYTES # BLD AUTO: 1.3 K/UL (ref 1–4.8)
LYMPHOCYTES NFR BLD: 10.2 % (ref 18–48)
MCH RBC QN AUTO: 27.8 PG (ref 27–31)
MCHC RBC AUTO-ENTMCNC: 32.7 G/DL (ref 32–36)
MCV RBC AUTO: 85 FL (ref 82–98)
MONOCYTES # BLD AUTO: 1.2 K/UL (ref 0.3–1)
MONOCYTES NFR BLD: 9.2 % (ref 4–15)
NEUTROPHILS # BLD AUTO: 10.1 K/UL (ref 1.8–7.7)
NEUTROPHILS NFR BLD: 80 % (ref 38–73)
NRBC BLD-RTO: 0 /100 WBC
PLATELET # BLD AUTO: 260 K/UL (ref 150–450)
PMV BLD AUTO: 9 FL (ref 9.2–12.9)
RBC # BLD AUTO: 4.6 M/UL (ref 4.6–6.2)
WBC # BLD AUTO: 12.64 K/UL (ref 3.9–12.7)

## 2024-05-17 PROCEDURE — 63600175 PHARM REV CODE 636 W HCPCS: Performed by: ORTHOPAEDIC SURGERY

## 2024-05-17 PROCEDURE — 99900035 HC TECH TIME PER 15 MIN (STAT)

## 2024-05-17 PROCEDURE — 97162 PT EVAL MOD COMPLEX 30 MIN: CPT

## 2024-05-17 PROCEDURE — 36415 COLL VENOUS BLD VENIPUNCTURE: CPT | Performed by: ORTHOPAEDIC SURGERY

## 2024-05-17 PROCEDURE — 97530 THERAPEUTIC ACTIVITIES: CPT

## 2024-05-17 PROCEDURE — 85025 COMPLETE CBC W/AUTO DIFF WBC: CPT | Performed by: ORTHOPAEDIC SURGERY

## 2024-05-17 PROCEDURE — 25000003 PHARM REV CODE 250: Performed by: ORTHOPAEDIC SURGERY

## 2024-05-17 RX ORDER — OXYCODONE AND ACETAMINOPHEN 10; 325 MG/1; MG/1
1 TABLET ORAL EVERY 4 HOURS PRN
Qty: 60 TABLET | Refills: 0 | Status: SHIPPED | OUTPATIENT
Start: 2024-05-17

## 2024-05-17 RX ORDER — CYCLOBENZAPRINE HCL 10 MG
10 TABLET ORAL 3 TIMES DAILY PRN
Qty: 30 TABLET | Refills: 0 | Status: SHIPPED | OUTPATIENT
Start: 2024-05-17 | End: 2024-05-27

## 2024-05-17 RX ADMIN — SODIUM CHLORIDE, POTASSIUM CHLORIDE, SODIUM LACTATE AND CALCIUM CHLORIDE: 600; 310; 30; 20 INJECTION, SOLUTION INTRAVENOUS at 04:05

## 2024-05-17 RX ADMIN — POLYETHYLENE GLYCOL 3350 17 G: 17 POWDER, FOR SOLUTION ORAL at 09:05

## 2024-05-17 RX ADMIN — OXYCODONE AND ACETAMINOPHEN 2 TABLET: 10; 325 TABLET ORAL at 04:05

## 2024-05-17 RX ADMIN — SENNOSIDES AND DOCUSATE SODIUM 1 TABLET: 8.6; 5 TABLET ORAL at 09:05

## 2024-05-17 RX ADMIN — OXYCODONE AND ACETAMINOPHEN 1 TABLET: 10; 325 TABLET ORAL at 11:05

## 2024-05-17 RX ADMIN — ATORVASTATIN CALCIUM 20 MG: 20 TABLET, FILM COATED ORAL at 09:05

## 2024-05-17 RX ADMIN — LATANOPROST 1 DROP: 50 SOLUTION OPHTHALMIC at 09:05

## 2024-05-17 RX ADMIN — OXYBUTYNIN CHLORIDE 10 MG: 5 TABLET, EXTENDED RELEASE ORAL at 09:05

## 2024-05-17 RX ADMIN — FAMOTIDINE 20 MG: 20 TABLET ORAL at 09:05

## 2024-05-17 RX ADMIN — CYCLOBENZAPRINE HYDROCHLORIDE 10 MG: 5 TABLET, FILM COATED ORAL at 06:05

## 2024-05-17 RX ADMIN — HYDROMORPHONE HYDROCHLORIDE 0.5 MG: 2 INJECTION INTRAMUSCULAR; INTRAVENOUS; SUBCUTANEOUS at 02:05

## 2024-05-17 RX ADMIN — MUPIROCIN: 20 OINTMENT TOPICAL at 09:05

## 2024-05-17 RX ADMIN — OXYCODONE AND ACETAMINOPHEN 1 TABLET: 10; 325 TABLET ORAL at 06:05

## 2024-05-17 RX ADMIN — CEFAZOLIN 2 G: 2 INJECTION, POWDER, FOR SOLUTION INTRAMUSCULAR; INTRAVENOUS at 04:05

## 2024-05-17 RX ADMIN — FINASTERIDE 5 MG: 5 TABLET, FILM COATED ORAL at 09:05

## 2024-05-17 NOTE — PT/OT/SLP PROGRESS
Physical Therapy Treatment    Patient Name:  Gage Hdez   MRN:  [id number]    Recommendations:     Discharge Recommendations: Low Intensity Therapy (pending progress with gait)  Discharge Equipment Recommendations: walker, rolling  Barriers to discharge:  Pain and mobility deficits     Assessment:     Gage Hdez is a 74 y.o. male admitted with a medical diagnosis of s/p lumbar fusion.  He presents with the following impairments/functional limitations: weakness, impaired endurance, impaired functional mobility, impaired balance, gait instability, decreased lower extremity function, pain, decreased ROM.    Pt pre-medicated for BID session but required significantly more assist with standing and gait trial with RW. Pt tolerated OOB x 1 1/2 hours today but unable to progress with gait training; may have to consider post-acute placement if unable to ambulate household distance.     Rehab Prognosis: Good; patient would benefit from acute skilled PT services to address these deficits and reach maximum level of function.    Recent Surgery: Procedure(s) (LRB):  FUSION, SPINE, LUMBAR OPEN POSTERIOR L4-L5 (N/A) 1 Day Post-Op    Plan:     During this hospitalization, patient to be seen BID to address the identified rehab impairments via therapeutic activities, gait training, therapeutic exercises, neuromuscular re-education and progress toward the following goals:    Plan of Care Expires:  05/31/24    Subjective     Chief Complaint: Pt reports he received Dilaudid and that he feels better but still hurting  Patient/Family Comments/goals: Pt's spouse at bedside, encouraging pt to participate with therapy, pt agreeable  Pain/Comfort:  Pain Rating 1: 7/10  Location 1: back  Pain Addressed 1: Pre-medicate for activity, Reposition, Cessation of Activity      Objective:     Communicated with BRENDON Rabago prior to session.  Patient found up in chair with peripheral IV, Other (comments)- accordion drain upon PT entry to room.     General  Precautions: Standard, fall  Orthopedic Precautions: spinal precautions  Braces: N/A  Respiratory Status: Room air     Functional Mobility:  Bed Mobility:     Scooting: moderate assistance  Sit to Supine: minimum assistance  Transfers:     Sit to Stand:  3 attempts with RW, successful 2/3 trials. Pt required Max A to stand from chair with cueing for hand placement. Pt with significant difficulty extending B LE to come to upright standing    Gait: 5' Max A with RW, pt ambulating with kyphotic posture and difficulty maintaining knee extension   Balance: Poor dynamic standing balance with RW, legs shaky in standing       AM-PAC 6 CLICK MOBILITY  Turning over in bed (including adjusting bedclothes, sheets and blankets)?: 3  Sitting down on and standing up from a chair with arms (e.g., wheelchair, bedside commode, etc.): 2  Moving from lying on back to sitting on the side of the bed?: 2  Moving to and from a bed to a chair (including a wheelchair)?: 2  Need to walk in hospital room?: 2  Climbing 3-5 steps with a railing?: 1  Basic Mobility Total Score: 12       Treatment & Education:  Pt and wife educated on role of PT, POC, reviewed log rolling, explained expectations/mobility goals to meet prior to clearance to D/C home.     Patient left supine with all lines intact, RN notified, and wife present..    GOALS:   Multidisciplinary Problems       Physical Therapy Goals          Problem: Physical Therapy    Goal Priority Disciplines Outcome Goal Variances Interventions   Physical Therapy Goal     PT, PT/OT Progressing     Description: PT goals to be met in 2 weeks as follows:   1. Mod I Bed mobility  2. Mod I T/F  3. Gait 150' Mod I with LRAD  4. Tolerate OOB x 2 hours                       Time Tracking:     PT Received On: 05/17/24  PT Start Time: 1450     PT Stop Time: 1506  PT Total Time (min): 16 min     Billable Minutes: Therapeutic Activity 13    Treatment Type: Treatment  PT/PTA: PT     Number of PTA visits since  last PT visit: 0     05/17/2024

## 2024-05-17 NOTE — ANESTHESIA POSTPROCEDURE EVALUATION
Anesthesia Post Evaluation    Patient: Gage Hdez    Procedure(s) Performed: Procedure(s) (LRB):  FUSION, SPINE, LUMBAR OPEN POSTERIOR L4-L5 (N/A)    Final Anesthesia Type: general      Patient location during evaluation: PACU  Patient participation: Yes- Able to Participate  Level of consciousness: awake and alert  Post-procedure vital signs: reviewed and stable  Pain management: adequate  Airway patency: patent  DAVID mitigation strategies: Extubation while patient is awake  PONV status at discharge: No PONV  Anesthetic complications: no      Cardiovascular status: hemodynamically stable  Respiratory status: unassisted  Hydration status: euvolemic  Follow-up not needed.              Vitals Value Taken Time   /80 05/16/24 1935   Temp 36.4 °C (97.6 °F) 05/16/24 1935   Pulse 66 05/16/24 1935   Resp 18 05/16/24 1935   SpO2 94 % 05/16/24 1935         Event Time   Out of Recovery 18:10:00         Pain/Adam Score: Pain Rating Prior to Med Admin: 4 (5/16/2024  5:09 PM)  Adam Score: 8 (5/16/2024  5:54 PM)

## 2024-05-17 NOTE — PLAN OF CARE
LMSW met with the patient at the bedside. Patient is alert and oriented with no communication barriers. Prior to admission, the patient is independent. Patient denies the use of HH. Patient has a cane. Patients PCP is Khushboo Bell at [facility].  Patient choice pharmacy is bedside. Patients' family will transport the patient home at discharge.     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]  Preop testing [Z01.818]    Admission Date: 5/16/2024  Expected Discharge Date: 5/19/2024    Transition of Care Barriers: (P) None    Payor: BCBS [place] MEDICARE / Plan: [employer] [place] / Product Type: Medicare Advantage /     Extended Emergency Contact Information  Primary Emergency Contact: Yancy Hdez  Address: [facility] [address] [place] [address] [facility]  Home Phone: [phone]  Work Phone: [phone]  Mobile Phone: [phone]  Relation: Spouse    Discharge Plan A: (P) Home, Home with family  Discharge Plan B: (P) Home Health      [id number] [address] AT [address] [address]  Phone: [phone] Fax: [phone]      Initial Assessment (most recent)       Adult Discharge Assessment - 05/17/24 0854          Discharge Assessment    Assessment Type Discharge Planning Assessment     Confirmed/corrected address, phone number and insurance Yes     Confirmed Demographics Correct on Facesheet     Source of Information patient;family;health record     People in Home spouse     Do you expect to return to your current living situation? Yes     Do you have help at home or  someone to help you manage your care at home? Yes     Prior to hospitilization cognitive status: Alert/Oriented;No Deficits     Current cognitive status: Alert/Oriented;No Deficits     Equipment Currently Used at Home cane, straight     Readmission within 30 days? No (P)      Patient currently being followed by outpatient case management? No (P)      Do you currently have service(s) that help you manage your care at home? No (P)      Do you take prescription medications? Yes (P)      Do you have prescription coverage? Yes (P)      Do you have any problems affording any of your prescribed medications? No (P)      Is the patient taking medications as prescribed? yes (P)      How do you get to doctors appointments? family or friend will provide;car, drives self (P)      Are you on dialysis? No (P)      Do you take coumadin? No (P)      Discharge Plan A Home;Home with family (P)      Discharge Plan B Home Health (P)      Discharge Plan discussed with: Patient (P)      Transition of Care Barriers None (P)

## 2024-05-17 NOTE — PLAN OF CARE
Problem: Adult Inpatient Plan of Care  Goal: Plan of Care Review  Outcome: Progressing  Goal: Patient-Specific Goal (Individualized)  Outcome: Progressing  Goal: Absence of Hospital-Acquired Illness or Injury  Outcome: Progressing  Goal: Optimal Comfort and Wellbeing  Outcome: Progressing  Goal: Readiness for Transition of Care  Outcome: Progressing     Problem: Wound  Goal: Optimal Coping  Outcome: Progressing  Goal: Optimal Functional Ability  Outcome: Progressing  Goal: Absence of Infection Signs and Symptoms  Outcome: Progressing  Goal: Improved Oral Intake  Outcome: Progressing  Goal: Optimal Pain Control and Function  Outcome: Progressing  Goal: Skin Health and Integrity  Outcome: Progressing  Goal: Optimal Wound Healing  Outcome: Progressing     Problem: Fall Injury Risk  Goal: Absence of Fall and Fall-Related Injury  Outcome: Progressing     Problem: Infection  Goal: Absence of Infection Signs and Symptoms  Outcome: Progressing

## 2024-05-17 NOTE — PT/OT/SLP EVAL
"Physical Therapy Evaluation    Patient Name:  Gage Hdez   MRN:  [id number]    Recommendations:     Discharge Recommendations: Low Intensity Therapy pending progress with gait  Discharge Equipment Recommendations: walker, rolling   Barriers to discharge:  Pain and mobility deficits    Assessment:     Gage Hdez is a 74 y.o. male admitted with a medical diagnosis of s/p lumbar fusion.  He presents with the following impairments/functional limitations: weakness, gait instability, impaired balance, impaired functional mobility, decreased lower extremity function, pain, decreased ROM.    Pt evaluated, able to complete OOB to chair t/f with RW but c/o significant low back incisional pain, reports not taking pain meds today. Pt would benefit from continued PT services to address post-op mobility deficits and increase functional independence. Plan for BID session today to work on increasing gait distance once pre-medicated.      The mobility limitation cannot be sufficiently resolved by the use of a cane.   Patient's functional mobility deficit can be sufficiently resolved with the use of a rolling walker. Patient's mobility limitation significantly impairs their ability to participate in one of more activities of daily living. The use of a rolling walker will significantly improve the patient's ability to participate in MRADLS and the patient will use it on regular basis in the home.     Rehab Prognosis: Good; patient would benefit from acute skilled PT services to address these deficits and reach maximum level of function.    Recent Surgery: Procedure(s) (LRB):  FUSION, SPINE, LUMBAR OPEN POSTERIOR L4-L5 (N/A) 1 Day Post-Op    Plan:     During this hospitalization, patient to be seen BID to address the identified rehab impairments via gait training, therapeutic activities, therapeutic exercises and progress toward the following goals:    Plan of Care Expires:  05/31/24    Subjective     Chief Complaint: "I haven't " "taken any pain medicine today. The percocet last night did not help. Now that I am going to get up it is probably going to hurt"  Patient/Family Comments/goals: Pt agreeable to PT eval   Pain/Comfort:  Pain Rating 1: 6/10  Location 1: back  Pain Addressed 1: Pre-medicate for activity, Cessation of Activity    Patients cultural, spiritual, Gnosticism conflicts given the current situation: no    Living Environment:  [facility] with 3 MATEO, has a ramp  Prior to admission, patients level of function was I with mobility household level, Mod I with small based quad cane for longer distances.  Equipment used at home: cane, quad.  DME owned (not currently used): shower chair.  Upon discharge, patient will have assistance from lives with wife.    Objective:     Communicated with called RN Gem prior to session.  Patient found supine with SCD, peripheral IV, Other (comments)- accordion drain  upon PT entry to room.    General Precautions: Standard, fall  Orthopedic Precautions:spinal precautions   Braces: N/A  Respiratory Status: Room air    Exams:  Sensation- Intact light touch, pt reports radicular symptoms he experienced pre-op have improved  B LE ROM WFL except limited R hip flexion 2/2 pain   B LE MMT 4+/5 except R hip flexion 3/5    Functional Mobility:  Bed Mobility- Rolling R Mod A, Mod A supine to sit and scooting to HOB, required cueing for log roll technique  Transfers- Sit>stand Mod A with bed height elevated, Stand>sit Min A to control descent into chair with RW  Gait- 6' Min A with RW to bedside chair, antalgic gait with increased c/o pain in standing      AM-PAC 6 CLICK MOBILITY  Total Score:13       Treatment & Education:  Pt educated on role of PT, POC, and mobility training. Issued handout on spinal precautions    Patient left up in chair with all lines intact, call button in reach, and RN notified.    GOALS:   Multidisciplinary Problems       Physical Therapy Goals          Problem: Physical Therapy    Goal " Priority Disciplines Outcome Goal Variances Interventions   Physical Therapy Goal     PT, PT/OT Progressing     Description: PT goals to be met in 2 weeks as follows:   1. Mod I Bed mobility  2. Mod I T/F  3. Gait 150' Mod I with LRAD  4. Tolerate OOB x 2 hours                       History:     Past Medical History:   Diagnosis Date    Essential (primary) hypertension     Heart murmur        Past Surgical History:   Procedure Laterality Date    TRANSURETHRAL RESECTION OF PROSTATE      PER PT FOR ED       Time Tracking:     PT Received On: 05/17/24  PT Start Time: 1306     PT Stop Time: 1326  PT Total Time (min): 20 min     Billable Minutes: Evaluation 15      05/17/2024

## 2024-05-17 NOTE — PROGRESS NOTES
Afebrile ramirez  L signs stable   Pain is controlled   Bilateral lower extremities neurovascular intact   Drainage  greater than 100   Hemoglobin 13.8   Status post lumbar fusion     PT   Pain control   SCDs and Aravind's   Possible discharge  tomorrow

## 2024-05-17 NOTE — PLAN OF CARE
D/C Rec: Low Intensity Therapy pending progress with gait  DME Rec: Rolling walker    Pt evaluated, able to complete OOB to chair t/f with RW but c/o significant low back incisional pain, reports not taking pain meds today. Pt would benefit from continued PT services to address post-op mobility deficits and increase functional independence. Plan for BID session today to work on increasing gait distance once pre-medicated.      Problem: Physical Therapy  Goal: Physical Therapy Goal  Description: PT goals to be met in 2 weeks as follows:   1. Mod I Bed mobility  2. Mod I T/F  3. Gait 150' Mod I with LRAD  4. Tolerate OOB x 2 hours  Outcome: Progressing

## 2024-05-17 NOTE — PLAN OF CARE
I certify I provided patient choice and a list to the patient/family of [facility].  Patient/Family signed Patient's Choice Disclosure Form choosing the following  1.First available   [facility] patient.   Patient is agreeable to a RW. Sw requested patient be reviewed for a RW.

## 2024-05-17 NOTE — PLAN OF CARE
[facility] ([address])      HOME HEALTH ORDERS  FACE TO FACE ENCOUNTER    Patient Name: Gage Hdez  YOB: 1950    PCP: No, Primary Doctor   PCP Address: None  PCP Phone Number: None  PCP Fax: None    Encounter Date: 4/25/24    Admit to Home Health    Diagnoses:  There are no hospital problems to display for this patient.      Follow Up Appointments:  No future appointments.    Allergies:Review of patient's allergies indicates:  No Known Allergies    Medications: Review discharge medications with patient and family and provide education.      Current Discharge Medication List        START taking these medications    Details   cyclobenzaprine (FLEXERIL) 10 MG tablet Take 1 tablet (10 mg total) by mouth 3 (three) times daily as needed for Muscle spasms.  Qty: 30 tablet, Refills: 0      oxyCODONE-acetaminophen (PERCOCET)  mg per tablet Take 1 tablet by mouth every 4 (four) hours as needed for Pain.  Qty: 60 tablet, Refills: 0    Comments: Quantity prescribed more than 7 day supply? Yes, quantity medically necessary           CONTINUE these medications which have NOT CHANGED    Details   atorvastatin (LIPITOR) 20 MG tablet Take 20 mg by mouth once daily.      finasteride (PROSCAR) 5 mg tablet Take 5 mg by mouth once daily.      HYZAAR 100-25 mg per tablet Take 1 tablet by mouth once daily. 100mg      lactulose (CHRONULAC) 10 gram/15 mL solution Take 10 g by mouth 2 (two) times daily as needed.      latanoprost 0.005 % ophthalmic solution Place 1 drop into both eyes once daily.      ergocalciferol (DRISDOL) 50,000 unit Cap Take 50,000 Units by mouth once as needed.      gabapentin (NEURONTIN) 300 MG capsule Take 100 mg by mouth once as needed (pain).      oxybutynin (DITROPAN-XL) 10 MG 24 hr tablet Take 10 mg by mouth once daily.               I have seen and examined this patient within the last 30 days. My clinical findings that support the need for the home health skilled services and  home bound status are the following:no   Requiring assistive device to leave home due to unsteady gait caused by  Surgery.     Diet:   regular diet    Referrals/ Consults  Physical Therapy to evaluate and treat. Evaluate for home safety and equipment needs; Establish/upgrade home exercise program. Perform / instruct on therapeutic exercises, gait training, transfer training, and Range of Motion.  Occupational Therapy to evaluate and treat. Evaluate home environment for safety and equipment needs. Perform/Instruct on transfers, ADL training, ROM, and therapeutic exercises.    Activities:   activity as tolerated    Nursing:   Agency to admit patient within 24 hours of hospital discharge unless specified on physician order or at patient request    SN to complete comprehensive assessment including routine vital signs. Instruct on disease process and s/s of complications to report to MD. Review/verify medication list sent home with the patient at time of discharge  and instruct patient/caregiver as needed. Frequency may be adjusted depending on start of care date.     Skilled nurse to perform up to 3 visits PRN for symptoms related to diagnosis    Notify MD if SBP > 160 or < 90; DBP > 90 or < 50; HR > 120 or < 50; Temp > 101; O2 < 88%    Ok to schedule additional visits based on staff availability and patient request on consecutive days within the home health episode.    When multiple disciplines ordered:    Start of Care occurs on Sunday - Wednesday schedule remaining discipline evaluations as ordered on separate consecutive days following the start of care.    Thursday SOC -schedule subsequent evaluations Friday and Monday the following week.     Friday - Saturday SOC - schedule subsequent discipline evaluations on consecutive days starting Monday of the following week.    For all post-discharge communication and subsequent orders please contact patient's primary care physician. If unable to reach primary care  physician or do not receive response within 30 minutes, please contact Dr Maverick Sullivan for clinical staff order clarification      Wound Care Orders  Remove dressing in 72 hours     I certify that this patient is confined to his home and needs intermittent skilled nursing care, physical therapy, and occupational therapy.    ______________________  Maverick Sullivan MD  5/17/24

## 2024-05-18 PROCEDURE — 97116 GAIT TRAINING THERAPY: CPT | Mod: CQ

## 2024-05-18 PROCEDURE — 97535 SELF CARE MNGMENT TRAINING: CPT

## 2024-05-18 PROCEDURE — 94761 N-INVAS EAR/PLS OXIMETRY MLT: CPT

## 2024-05-18 PROCEDURE — 25000003 PHARM REV CODE 250: Performed by: ORTHOPAEDIC SURGERY

## 2024-05-18 PROCEDURE — 97166 OT EVAL MOD COMPLEX 45 MIN: CPT

## 2024-05-18 PROCEDURE — 99900035 HC TECH TIME PER 15 MIN (STAT)

## 2024-05-18 PROCEDURE — 97530 THERAPEUTIC ACTIVITIES: CPT | Mod: CQ

## 2024-05-18 PROCEDURE — 97530 THERAPEUTIC ACTIVITIES: CPT

## 2024-05-18 RX ADMIN — ATORVASTATIN CALCIUM 20 MG: 20 TABLET, FILM COATED ORAL at 08:05

## 2024-05-18 RX ADMIN — MUPIROCIN: 20 OINTMENT TOPICAL at 08:05

## 2024-05-18 RX ADMIN — SENNOSIDES AND DOCUSATE SODIUM 1 TABLET: 8.6; 5 TABLET ORAL at 08:05

## 2024-05-18 RX ADMIN — FAMOTIDINE 20 MG: 20 TABLET ORAL at 08:05

## 2024-05-18 RX ADMIN — OXYBUTYNIN CHLORIDE 10 MG: 5 TABLET, EXTENDED RELEASE ORAL at 08:05

## 2024-05-18 RX ADMIN — LATANOPROST 1 DROP: 50 SOLUTION OPHTHALMIC at 08:05

## 2024-05-18 RX ADMIN — POLYETHYLENE GLYCOL 3350 17 G: 17 POWDER, FOR SOLUTION ORAL at 08:05

## 2024-05-18 RX ADMIN — OXYCODONE AND ACETAMINOPHEN 1 TABLET: 10; 325 TABLET ORAL at 10:05

## 2024-05-18 RX ADMIN — OXYCODONE AND ACETAMINOPHEN 1 TABLET: 10; 325 TABLET ORAL at 05:05

## 2024-05-18 RX ADMIN — FINASTERIDE 5 MG: 5 TABLET, FILM COATED ORAL at 08:05

## 2024-05-18 NOTE — PLAN OF CARE
05/18/24 0837   Final Note   Assessment Type Final Discharge Note   Anticipated Discharge Disposition Home   Hospital Resources/Appts/Education Provided Provided patient/caregiver with written discharge plan information;Appointments scheduled and added to AVS   Post-Acute Status   Discharge Delays None known at this time

## 2024-05-18 NOTE — PLAN OF CARE
05/18/24 0836   Discharge Planning   Assessment Type Discharge Planning Assessment   Resource/Environmental Concerns none   Support Systems Spouse/significant other   Equipment Currently Used at Home none   Current Living Arrangements home   Patient/Family Anticipated Services at Transition none   DME Needed Upon Discharge  none   Discharge Plan A Home   Discharge Plan B Home with family

## 2024-05-18 NOTE — PT/OT/SLP PROGRESS
Physical Therapy Treatment    Patient Name:  Gage Hdez   MRN:  [id number]    Recommendations:     Discharge Recommendations: High Intensity Therapy (pending progress with therapy)  Discharge Equipment Recommendations: bedside commode, walker, rolling  Barriers to discharge:  pain, mobility deficits    Assessment:     Gage Hdez is a 74 y.o. male admitted with a medical diagnosis of <principal problem not specified>.  He presents with the following impairments/functional limitations: weakness, gait instability, pain, orthopedic precautions, impaired balance, impaired endurance, impaired functional mobility, impaired self care skills, impaired skin, decreased lower extremity function, decreased ROM, decreased safety awareness, decreased upper extremity function.    Sit>stand from chair with RW and maxA (x 3 trials), cueing for preparatory movements (scooting anteriorly toward edge of chair, increased knee flexion, use of BUE on armrests)  Amb ~15' with RW and min/modA, chair follow, cueing for upright posture, pt with decreased gait speed, adan and B step length  Pt able to perform limited ambulation in room, continues to require maxA to stand, still waiting on LSO to be delivered  Rec High Intensity Therapy (pending progress with therapy)    Prior to admission pt was independent with mobility and self-care and there is expectation of returning to prior level of function to maintain independence avoiding readmission. Pt is at high risk of unplanned readmission due to fall risk and lack of 24 hour caregiver in prior setting. The lower level of care cannot provide total interdisciplinary approach needed. Pt is able to tolerate 3 hours of daily therapy. Pt is pleasant and motivated to return to prior level of function.       Rehab Prognosis: Good; patient would benefit from acute skilled PT services to address these deficits and reach maximum level of function.    Recent Surgery: Procedure(s) (LRB):  FUSION, SPINE,  LUMBAR OPEN POSTERIOR L4-L5 (N/A) 2 Days Post-Op    Plan:     During this hospitalization, patient to be seen BID to address the identified rehab impairments via gait training, therapeutic activities, therapeutic exercises, neuromuscular re-education and progress toward the following goals:    Plan of Care Expires:  05/31/24    Subjective     Chief Complaint: pain / need to urinate  Patient/Family Comments/goals: pt thought he was wearing a diaper and urinated upon standing  Pain/Comfort:  Pain Rating 1: 5/10  Location - Orientation 1: lower  Location 1: back  Pain Addressed 1: Pre-medicate for activity, Reposition, Distraction, Cessation of Activity  Pain Rating Post-Intervention 1: 5/10      Objective:     Communicated with nurse Benavides prior to session.  Patient found up in chair with telemetry, peripheral IV, hemovac, SCD upon PT entry to room.     General Precautions: Standard, fall  Orthopedic Precautions: spinal precautions  Braces: N/A  Respiratory Status: Room air     Functional Mobility:  Transfers:     Sit to Stand:  maximal assistance with rolling walker  Gait: ~15' with RW and min/modA, chair follow, cueing for upright posture, pt with decreased gait speed, adan and B step length      AM-PAC 6 CLICK MOBILITY  Turning over in bed (including adjusting bedclothes, sheets and blankets)?: 3  Sitting down on and standing up from a chair with arms (e.g., wheelchair, bedside commode, etc.): 2  Moving from lying on back to sitting on the side of the bed?: 2  Moving to and from a bed to a chair (including a wheelchair)?: 2  Need to walk in hospital room?: 2  Climbing 3-5 steps with a railing?: 1  Basic Mobility Total Score: 12       Treatment & Education:  Gait training as noted  Pt educated on spinal precautions and on technique for sit<>stand    Patient left up in chair with all lines intact, call button in reach, spouse present, and lunch served ..    GOALS:   Multidisciplinary Problems       Physical  Therapy Goals          Problem: Physical Therapy    Goal Priority Disciplines Outcome Goal Variances Interventions   Physical Therapy Goal     PT, PT/OT Progressing     Description: PT goals to be met in 2 weeks as follows:   1. Mod I Bed mobility  2. Mod I T/F  3. Gait 150' Mod I with LRAD  4. Tolerate OOB x 2 hours                       Time Tracking:     PT Received On: 05/18/24  PT Start Time: 1249     PT Stop Time: 1321  PT Total Time (min): 32 min     Billable Minutes: Gait Training 16 and Therapeutic Activity 16    Treatment Type: Treatment  PT/PTA: PTA     Number of PTA visits since last PT visit: 1 05/18/2024

## 2024-05-18 NOTE — PROGRESS NOTES
POD#2 s/p L4-5 PSF  AF  Drain 365cc/24 hrs  Incision c/d  - continue PT/OT  - drain out when output <50cc

## 2024-05-18 NOTE — PLAN OF CARE
Problem: Occupational Therapy  Goal: Occupational Therapy Goal  Description: Goals to be met by: 6/1/2024     Patient will increase functional independence with ADLs by performing:    UE Dressing with Minimal Assistance.  LE Dressing with Minimal Assistance and Assistive Devices as needed.  Grooming while standing at sink with Contact Guard Assistance.  Toileting from bedside commode with Minimal Assistance for hygiene and clothing management.   Toilet transfer to bedside commode with Minimal Assistance.    Outcome: Progressing     Initial OT eval/treat complete.  Has QC, ramp entry into home, and built-in shower seat.  Currently needs BSC as Pt. Is with limited functional ambulation and ability to reach typical toileting facilities; also required to decrease trunk mobility during transitional movements d/t recent spinal sx.  Recommend post acute High Intensity therapy though will continue to assess needs pending progress.  To benefit from continued acute care OT services to increase independence in self-care/functional transfers.  OT to follow.

## 2024-05-18 NOTE — PLAN OF CARE
Plan of care and expectation from team discussed with patient. Spouse at bedside most of day. Pt participated with PT and OT today and remained in chair most of day. PRN pain medication given with full relief of pain. On senna and miralax daily. Last BM 5/13. Patient states has chronic constipation at home. Dressing changed to incision, bandage moist at time of change. Back brace delivered and placed on patient. Instructed patient to take off when in bed. Drain output documented.         Problem: Adult Inpatient Plan of Care  Goal: Plan of Care Review  Outcome: Progressing  Goal: Patient-Specific Goal (Individualized)  Outcome: Progressing  Goal: Absence of Hospital-Acquired Illness or Injury  Outcome: Progressing  Goal: Optimal Comfort and Wellbeing  Outcome: Progressing  Goal: Readiness for Transition of Care  Outcome: Progressing     Problem: Wound  Goal: Optimal Coping  Outcome: Progressing  Goal: Optimal Functional Ability  Outcome: Progressing  Goal: Absence of Infection Signs and Symptoms  Outcome: Progressing  Goal: Improved Oral Intake  Outcome: Progressing  Goal: Optimal Pain Control and Function  Outcome: Progressing  Goal: Skin Health and Integrity  Outcome: Progressing  Goal: Optimal Wound Healing  Outcome: Progressing     Problem: Fall Injury Risk  Goal: Absence of Fall and Fall-Related Injury  Outcome: Progressing     Problem: Infection  Goal: Absence of Infection Signs and Symptoms  Outcome: Progressing     Problem: Skin Injury Risk Increased  Goal: Skin Health and Integrity  Outcome: Progressing     Problem: Spinal Surgery  Goal: Optimal Coping with Surgery  Outcome: Progressing  Goal: Absence of Bleeding  Outcome: Progressing  Goal: Effective Bowel Elimination  Outcome: Progressing  Goal: Fluid and Electrolyte Balance  Outcome: Progressing  Goal: Optimal Functional Ability  Outcome: Progressing  Goal: Absence of Infection Signs and Symptoms  Outcome: Progressing  Goal: Optimal Neurologic  Function  Outcome: Progressing  Goal: Anesthesia/Sedation Recovery  Outcome: Progressing  Goal: Optimal Pain Control and Function  Outcome: Progressing  Goal: Nausea and Vomiting Relief  Outcome: Progressing  Goal: Effective Urinary Elimination  Outcome: Progressing  Goal: Effective Oxygenation and Ventilation  Outcome: Progressing

## 2024-05-18 NOTE — PT/OT/SLP EVAL
Occupational Therapy   Evaluation and Treatment    Name: Gage Hdez  MRN: [id number]  Admitting Diagnosis: <principal problem not specified>  Recent Surgery: Procedure(s) (LRB):  FUSION, SPINE, LUMBAR OPEN POSTERIOR L4-L5 (N/A) 2 Days Post-Op    Recommendations:     Discharge Recommendations: High Intensity Therapy (though will continue to assess needs)  Discharge Equipment Recommendations:  bedside commode (will defer AD needs to PT)  Barriers to discharge:   (current functional level)    Assessment:   Initial OT eval/treat complete.  Sit<>stand bedside chair<>RW with MAX A for lift/lower and increased cuing for preparatory positioning.  MAX A for donning mesh underwear with assist for threading while seated EOB and though able to partially pull up to waist requiring assist for pulling clothing completely up.  Received review of spinal precautions at beginning of session though unable to list at end of session even with acronym reminder.  Has QC, ramp entry into home, and built-in shower seat.  Currently needs BSC as Pt. Is with limited functional ambulation and ability to reach typical toileting facilities; also required to decrease trunk mobility during transitional movements d/t recent spinal sx.  Recommend post acute High Intensity therapy though will continue to assess needs pending progress.  To benefit from continued acute care OT services to increase independence in self-care/functional transfers.  OT to follow.      Gage Hdez is a 74 y.o. male with a medical diagnosis of <principal problem not specified>.  He presents with below deficits decreasing independence in self-care/functional transfers. Performance deficits affecting function: weakness, impaired endurance, impaired self care skills, impaired functional mobility, gait instability, impaired balance, decreased upper extremity function, decreased lower extremity function, decreased safety awareness, pain, decreased ROM, orthopedic precautions,  impaired skin.      Rehab Prognosis: Good; patient would benefit from acute skilled OT services to address these deficits and reach maximum level of function.       Plan:     Patient to be seen daily to address the above listed problems via self-care/home management, therapeutic activities, therapeutic exercises  Plan of Care Expires: 06/01/24  Plan of Care Reviewed with: patient, spouse    Subjective     Chief Complaint: With c/o lower back pain.   Patient/Family Comments/goals: No goals stated at this time.      Occupational Profile:  Lives with spouse in [facility] with ramp entry; bathroom setup as walk-in shower with built-in seat and standard toilet.  Ambulating with and without QC reporting use when having knee issues.  Previously MOD I with ADL and still drives.  Spouse does all cooking/cleaning.  Pt. Enjoys playing on his computer and watching science fiction on television.   Equipment Used at Home: cane, quad (built-in shower chair)  Assistance upon Discharge: Lives with spouse.     Pain/Comfort:  Pain Rating 1: 5/10 (at rest)  Location - Orientation 1: lower  Location 1: back  Pain Addressed 1: Distraction, Cessation of Activity, Nurse notified, Reposition, Pre-medicate for activity  Pain Rating Post-Intervention 1: 8/10 (during and after activity)    Patients cultural, spiritual, [employer] conflicts given the current situation:  (None stated.)    Objective:     Communicated with: Makayla MCDANIELS RN prior to session.  Patient found up in chair with telemetry, peripheral IV (accordian drain to back; SCD present though not donned) upon OT entry to room; spouse entering room during session.    General Precautions: Standard, fall  Orthopedic Precautions: spinal precautions  Braces: LSO (Not present in room; inquired with RN and  about brace though both unsure of status.  Contacted brace line @1010 to setup delivery with representative reporting that the brace will be delivered later today.)  Respiratory  Status: Room air    Occupational Performance:    Functional Mobility/Transfers:  Sit<>stand X 2 trials with MAX A for lift/lower and increased cuing for hand transitioning from chair armrests to RW with increased cues for more upright trunk and LE extension once in stance and increased time.      Activities of Daily Living:  MAX A to don mesh underwear with assist for threading BLE while seated; able to pull clothing partially to waist in stance with BUE though assist needed to pull completely up.  Voiding seated at chair with retrieval of urinal and also handed wipes to clean front james region while seated requiring setup with urinal and wipes.  Hand hygiene seated at bedside chair with retrieval of soapy, rinsing, and drying towel.      Cognitive/Visual Perceptual:  Cognitive/Psychosocial Skills:  -       Oriented to: Person, Place, Time, and Situation   -       Follows Commands/attention:Follows one-step commands  -       Communication: able to make basic needs known and answer questions appropriately  -       Memory: No Deficits noted  -       Safety awareness/insight to disability: impaired   -       Mood/Affect/Coping skills/emotional control: Cooperative and Groggy  Visual/Perceptual:    -grossly intact    Physical Exam:  Postural examination/scapula alignment: -       Rounded shoulders  -       Forward head  Skin integrity: Visible skin intact and well documented back incision in EMR   Upper Extremity Range of Motion:  -       Right Upper Extremity: WFL except shoulders only tested to 90 degrees  -       Left Upper Extremity:  WFL except shoulders only tested to 90 degrees  Upper Extremity Strength: -       Right Upper Extremity: proximally at least 3/5 and distally at least 3-/5 with no resistance added and no overhead movement d/t spinal precautions  -       Left Upper Extremity: proximally at least 3/5 and distally at least 3-/5 with no resistance added and no overhead movement d/t spinal  precautions   Strength: -       Right Upper Extremity: WFL  -       Left Upper Extremity: WFL  Fine Motor Coordination: -       Intact  Left hand thumb/finger opposition skills and Right hand thumb/finger opposition skills    [facility] 6 Click ADL:  [facility] Total Score: 14    Treatment & Education:  Educated on role of OT, POC, and spinal precautions.  Sit<>stand X 2 trials with MAX A for lift/lower and increased cuing for hand transitioning from chair armrests to RW with increased cues for more upright trunk and LE extension once in stance and increased time.    MAX A to don mesh underwear with assist for threading BLE while seated; able to pull clothing partially to waist in stance with BUE though assist needed to pull completely up.  Voiding seated at chair with retrieval of urinal and also handed wipes to clean front james region while seated requiring setup with urinal and wipes.  Hand hygiene seated at bedside chair with retrieval of soapy, rinsing, and drying towel.    Receiving call light review and importance of calling for assist as needed.     Patient left up in chair with all lines intact, call button in reach, nursing notified, and spouse present    GOALS:   Multidisciplinary Problems       Occupational Therapy Goals          Problem: Occupational Therapy    Goal Priority Disciplines Outcome Interventions   Occupational Therapy Goal     OT, PT/OT Progressing    Description: Goals to be met by: 6/1/2024     Patient will increase functional independence with ADLs by performing:    UE Dressing with Minimal Assistance.  LE Dressing with Minimal Assistance and Assistive Devices as needed.  Grooming while standing at sink with Contact Guard Assistance.  Toileting from bedside commode with Minimal Assistance for hygiene and clothing management.   Toilet transfer to bedside commode with Minimal Assistance.                         History:     Past Medical History:   Diagnosis Date    Essential (primary)  hypertension     Heart murmur          Past Surgical History:   Procedure Laterality Date    TRANSURETHRAL RESECTION OF PROSTATE      PER PT FOR ED       Time Tracking:     OT Date of Treatment: 05/18/24  OT Start Time: 0918  OT Stop Time: 0957  OT Total Time (min): 39 min    Billable Minutes:Evaluation 15  Self Care/Home Management 24 5/18/2024

## 2024-05-18 NOTE — PT/OT/SLP PROGRESS
Physical Therapy Treatment    Patient Name:  Gage Hdez   MRN:  [id number]    Recommendations:     Discharge Recommendations: Low Intensity Therapy  Discharge Equipment Recommendations: walker, rolling  Barriers to discharge:  pain, mobility deficits    Assessment:     Gage Hdez is a 74 y.o. male admitted with a medical diagnosis of <principal problem not specified>.  He presents with the following impairments/functional limitations: weakness, gait instability, pain, impaired balance, impaired endurance, impaired functional mobility, decreased ROM, decreased lower extremity function.    Sit>stand from EOB with RW and maxA (x 2 trials)  Static stand at EOB x 60 secs with RW and modA while pt voided into urinal, hips and knees flexed, cues given for escobar extension with poor carryover  Bed>Chair with RW and modA-maxA, step transfer  Amb ~4' with RW and modA regressing to maxA as pt attempted to sit prematurely on armrest of recliner and was cued to position himself properly before lowering into chair; decreased gait speed, adan and B step length, increased B knee and hip flexion  Pt not currently safe to ambulate 2/2 pain and gait deviations  Rec Low Intensity Therapy pending progress with gait    The mobility limitation cannot be sufficiently resolved by the use of a cane.   Patient's functional mobility deficit can be sufficiently resolved with the use of a rolling walker. Patient's mobility limitation significantly impairs their ability to participate in one of more activities of daily living. The use of a rolling walker will significantly improve the patient's ability to participate in MRADLS and the patient will use it on regular basis in the home.       Rehab Prognosis: Good; patient would benefit from acute skilled PT services to address these deficits and reach maximum level of function.    Recent Surgery: Procedure(s) (LRB):  FUSION, SPINE, LUMBAR OPEN POSTERIOR L4-L5 (N/A) 2 Days Post-Op    Plan:      During this hospitalization, patient to be seen BID to address the identified rehab impairments via gait training, therapeutic activities, therapeutic exercises, neuromuscular re-education and progress toward the following goals:    Plan of Care Expires:  05/31/24    Subjective     Chief Complaint: pain  Patient/Family Comments/goals: pt agreeable to therapy  Pain/Comfort:  Pain Rating 1: 7/10  Location - Orientation 1: lower  Location 1: back  Pain Addressed 1: Pre-medicate for activity, Reposition, Distraction, Cessation of Activity  Pain Rating Post-Intervention 1: 7/10      Objective:     Communicated with nurse Benavides prior to session.  Patient found sitting edge of bed with peripheral IV, hemovac upon PT entry to room.     General Precautions: Standard, fall  Orthopedic Precautions: spinal precautions  Braces: N/A  Respiratory Status: Room air     Functional Mobility:  Transfers:     Sit to Stand:  maximal assistance with rolling walker  Bed to Chair: moderate assistance and maximal assistance with  rolling walker  using  Step Transfer  Gait: ~4' with RW and modA regressing to maxA as pt attempted to sit prematurely on armrest of recliner and was cued to position himself properly before lowering into chair; decreased gait speed, adan and B step length, increased B knee and hip flexion      AM-PAC 6 CLICK MOBILITY  Turning over in bed (including adjusting bedclothes, sheets and blankets)?: 3  Sitting down on and standing up from a chair with arms (e.g., wheelchair, bedside commode, etc.): 2  Moving from lying on back to sitting on the side of the bed?: 2  Moving to and from a bed to a chair (including a wheelchair)?: 2  Need to walk in hospital room?: 2  Climbing 3-5 steps with a railing?: 1  Basic Mobility Total Score: 12       Treatment & Education:  Pt instructed in log rolling technique (found already EOB)  Transfer/gait training as noted  Seated therex BLE: heel raises x 10    Patient left up in  chair with all lines intact, call button in reach, nurse Makayla notified, and breakfast served ..    GOALS:   Multidisciplinary Problems       Physical Therapy Goals          Problem: Physical Therapy    Goal Priority Disciplines Outcome Goal Variances Interventions   Physical Therapy Goal     PT, PT/OT Progressing     Description: PT goals to be met in 2 weeks as follows:   1. Mod I Bed mobility  2. Mod I T/F  3. Gait 150' Mod I with LRAD  4. Tolerate OOB x 2 hours                       Time Tracking:     PT Received On: 05/18/24  PT Start Time: 0821     PT Stop Time: 0847  PT Total Time (min): 26 min     Billable Minutes: Gait Training 13 and Therapeutic Activity 13    Treatment Type: Treatment  PT/PTA: PTA     Number of PTA visits since last PT visit: 1 05/18/2024

## 2024-05-19 PROCEDURE — 99900035 HC TECH TIME PER 15 MIN (STAT)

## 2024-05-19 PROCEDURE — 25000003 PHARM REV CODE 250: Performed by: ORTHOPAEDIC SURGERY

## 2024-05-19 PROCEDURE — 97116 GAIT TRAINING THERAPY: CPT

## 2024-05-19 RX ADMIN — FINASTERIDE 5 MG: 5 TABLET, FILM COATED ORAL at 08:05

## 2024-05-19 RX ADMIN — CYCLOBENZAPRINE HYDROCHLORIDE 10 MG: 5 TABLET, FILM COATED ORAL at 08:05

## 2024-05-19 RX ADMIN — LACTULOSE 10 G: 20 SOLUTION ORAL at 10:05

## 2024-05-19 RX ADMIN — POLYETHYLENE GLYCOL 3350 17 G: 17 POWDER, FOR SOLUTION ORAL at 08:05

## 2024-05-19 RX ADMIN — SENNOSIDES AND DOCUSATE SODIUM 1 TABLET: 8.6; 5 TABLET ORAL at 08:05

## 2024-05-19 RX ADMIN — OXYCODONE AND ACETAMINOPHEN 2 TABLET: 10; 325 TABLET ORAL at 08:05

## 2024-05-19 RX ADMIN — ATORVASTATIN CALCIUM 20 MG: 20 TABLET, FILM COATED ORAL at 08:05

## 2024-05-19 RX ADMIN — OXYCODONE AND ACETAMINOPHEN 2 TABLET: 10; 325 TABLET ORAL at 04:05

## 2024-05-19 RX ADMIN — OXYBUTYNIN CHLORIDE 10 MG: 5 TABLET, EXTENDED RELEASE ORAL at 08:05

## 2024-05-19 RX ADMIN — FAMOTIDINE 20 MG: 20 TABLET ORAL at 08:05

## 2024-05-19 RX ADMIN — LATANOPROST 1 DROP: 50 SOLUTION OPHTHALMIC at 08:05

## 2024-05-19 NOTE — PLAN OF CARE
Problem: Adult Inpatient Plan of Care  Goal: Plan of Care Review  Outcome: Progressing  Goal: Patient-Specific Goal (Individualized)  Outcome: Progressing  Goal: Absence of Hospital-Acquired Illness or Injury  Outcome: Progressing  Goal: Optimal Comfort and Wellbeing  Outcome: Progressing  Goal: Readiness for Transition of Care  Outcome: Progressing     Problem: Wound  Goal: Optimal Coping  Outcome: Progressing  Goal: Optimal Functional Ability  Outcome: Progressing  Goal: Absence of Infection Signs and Symptoms  Outcome: Progressing  Goal: Improved Oral Intake  Outcome: Progressing  Goal: Optimal Pain Control and Function  Outcome: Progressing  Goal: Skin Health and Integrity  Outcome: Progressing  Goal: Optimal Wound Healing  Outcome: Progressing     Problem: Wound  Goal: Optimal Functional Ability  Outcome: Progressing     Problem: Skin Injury Risk Increased  Goal: Skin Health and Integrity  Outcome: Progressing     Problem: Infection  Goal: Absence of Infection Signs and Symptoms  Outcome: Progressing     Problem: Spinal Surgery  Goal: Optimal Coping with Surgery  Outcome: Progressing  Goal: Absence of Bleeding  Outcome: Progressing  Goal: Effective Bowel Elimination  Outcome: Progressing  Goal: Fluid and Electrolyte Balance  Outcome: Progressing  Goal: Optimal Functional Ability  Outcome: Progressing  Goal: Absence of Infection Signs and Symptoms  Outcome: Progressing  Goal: Optimal Neurologic Function  Outcome: Progressing  Goal: Anesthesia/Sedation Recovery  Outcome: Progressing  Goal: Optimal Pain Control and Function  Outcome: Progressing  Goal: Nausea and Vomiting Relief  Outcome: Progressing  Goal: Effective Urinary Elimination  Outcome: Progressing  Goal: Effective Oxygenation and Ventilation  Outcome: Progressing

## 2024-05-19 NOTE — PLAN OF CARE
Plan of care and expectation from team discussed with patient and he verbalized complete understanding. Patient participated with PT and OT today. Back brace in place when out of bed. Sat in bedside chair most of day.  C/O pain this afternoon, PRN pain medication given. Drain output today 50 mL. Dressing placed early this morning still clean/dry/intact. No bowel movement today, received dose lactulose. Enema ordered, but patient refusing for today. Says will do tomorrow if now bowel movement overnight. No changes to neurovascular assessment.         Problem: Adult Inpatient Plan of Care  Goal: Plan of Care Review  Outcome: Progressing  Goal: Patient-Specific Goal (Individualized)  Outcome: Progressing  Goal: Absence of Hospital-Acquired Illness or Injury  Outcome: Progressing  Goal: Optimal Comfort and Wellbeing  Outcome: Progressing  Goal: Readiness for Transition of Care  Outcome: Progressing     Problem: Wound  Goal: Optimal Coping  Outcome: Progressing  Goal: Optimal Functional Ability  Outcome: Progressing  Goal: Absence of Infection Signs and Symptoms  Outcome: Progressing  Goal: Improved Oral Intake  Outcome: Progressing  Goal: Optimal Pain Control and Function  Outcome: Progressing  Goal: Skin Health and Integrity  Outcome: Progressing  Goal: Optimal Wound Healing  Outcome: Progressing     Problem: Fall Injury Risk  Goal: Absence of Fall and Fall-Related Injury  Outcome: Progressing     Problem: Spinal Surgery  Goal: Optimal Coping with Surgery  Outcome: Progressing  Goal: Absence of Bleeding  Outcome: Progressing  Goal: Effective Bowel Elimination  Outcome: Progressing  Goal: Fluid and Electrolyte Balance  Outcome: Progressing  Goal: Optimal Functional Ability  Outcome: Progressing  Goal: Absence of Infection Signs and Symptoms  Outcome: Progressing  Goal: Optimal Neurologic Function  Outcome: Progressing  Goal: Anesthesia/Sedation Recovery  Outcome: Progressing  Goal: Optimal Pain Control and  Function  Outcome: Progressing  Goal: Nausea and Vomiting Relief  Outcome: Progressing  Goal: Effective Urinary Elimination  Outcome: Progressing  Goal: Effective Oxygenation and Ventilation  Outcome: Progressing     Problem: Skin Injury Risk Increased  Goal: Skin Health and Integrity  Outcome: Progressing     Problem: Infection  Goal: Absence of Infection Signs and Symptoms  Outcome: Progressing

## 2024-05-19 NOTE — PROGRESS NOTES
POD #3 s/p L4-5 PSF  AF  Drain 200cc/24 hrs  Incision c/d    - continue PT/OT, possible rehab placement pending  - drain out when output <50cc

## 2024-05-19 NOTE — PT/OT/SLP PROGRESS
Physical Therapy Treatment    Patient Name:  Gage Hdez   MRN:  [id number]    Recommendations:     Discharge Recommendations: High Intensity Therapy  Discharge Equipment Recommendations: bedside commode, walker, rolling  Barriers to discharge:  increased assist needed at home    Assessment:     Gage Hdez is a 74 y.o. male admitted with a medical diagnosis of <principal problem not specified>.  He presents with the following impairments/functional limitations: weakness, impaired endurance, gait instability, impaired balance, pain, decreased ROM, decreased lower extremity function, impaired muscle length. Patient was able to rise with Mod A but moved before cues to scoot forward in the chair. He attempted to stand by pressing through the arms instead of hinging at the hips in order to shift his weight forward. Since he was attempting to push upward, he still needed Mod A to rise from the chair and increased challenges with full upright posture. He was able to amb around 30 feet today and made it into the hallway today with good control. He did attempt to walk too fast and needed a standing rest break before returning to the chair. He will continue to benefit from therapy and HIT upon DC    Rehab Prognosis: Good; patient would benefit from acute skilled PT services to address these deficits and reach maximum level of function.    Recent Surgery: Procedure(s) (LRB):  FUSION, SPINE, LUMBAR OPEN POSTERIOR L4-L5 (N/A) 3 Days Post-Op    Plan:     During this hospitalization, patient to be seen daily to address the identified rehab impairments via gait training, therapeutic activities, therapeutic exercises, neuromuscular re-education and progress toward the following goals:    Plan of Care Expires:  05/31/24    Subjective     Chief Complaint: back is still hurting some, the brace feels secure  Patient/Family Comments/goals: family present and supportive  Pain/Comfort:  Pain Rating 1: 8/10  Location - Orientation 1:  lower  Location 1: back  Pain Rating Post-Intervention 1: 6/10      Objective:     Communicated with nsg prior to session.  Patient found up in chair with telemetry, peripheral IV, hemovac, SCD upon PT entry to room.     General Precautions: Standard, fall  Orthopedic Precautions: spinal precautions  Braces: LSO  Respiratory Status: Room air     Functional Mobility:  Transfers:  Sit to Stand:  moderate assistance with rolling walker  Gait: amb 30 feet with 1 standing rest break. Increased cues needed to focus on upright standing posture and not to move too fast      AM-PAC 6 CLICK MOBILITY  Turning over in bed (including adjusting bedclothes, sheets and blankets)?: 3  Sitting down on and standing up from a chair with arms (e.g., wheelchair, bedside commode, etc.): 2  Moving from lying on back to sitting on the side of the bed?: 3  Moving to and from a bed to a chair (including a wheelchair)?: 2  Need to walk in hospital room?: 3  Climbing 3-5 steps with a railing?: 1  Basic Mobility Total Score: 14       Treatment & Education:   PT educated patient:  PT plan of care/role of PT  Safety with OOB mobility  Use of RW for household and community ambulation.   Energy conservation techniques   Discharge disposition    Pt  verbalized understanding   2. Reviewed spinal precautions    Patient left up in chair with call button in reach..    GOALS:   Multidisciplinary Problems       Physical Therapy Goals          Problem: Physical Therapy    Goal Priority Disciplines Outcome Goal Variances Interventions   Physical Therapy Goal     PT, PT/OT Progressing     Description: PT goals to be met in 2 weeks as follows:   1. Mod I Bed mobility  2. Mod I T/F  3. Gait 150' Mod I with LRAD  4. Tolerate OOB x 2 hours                       Time Tracking:     PT Received On: 05/19/24  PT Start Time: 1035     PT Stop Time: 1050  PT Total Time (min): 15 min     Billable Minutes: Gait Training 15    Treatment Type: Treatment  PT/PTA: PT      Number of PTA visits since last PT visit: 1     05/19/2024

## 2024-05-20 VITALS
BODY MASS INDEX: 25.84 KG/M2 | RESPIRATION RATE: 18 BRPM | SYSTOLIC BLOOD PRESSURE: 135 MMHG | DIASTOLIC BLOOD PRESSURE: 70 MMHG | OXYGEN SATURATION: 95 % | WEIGHT: 195 LBS | HEART RATE: 91 BPM | HEIGHT: 73 IN | TEMPERATURE: 98 F

## 2024-05-20 PROCEDURE — 99900035 HC TECH TIME PER 15 MIN (STAT)

## 2024-05-20 PROCEDURE — 25000003 PHARM REV CODE 250: Performed by: ORTHOPAEDIC SURGERY

## 2024-05-20 PROCEDURE — 94761 N-INVAS EAR/PLS OXIMETRY MLT: CPT

## 2024-05-20 PROCEDURE — 97530 THERAPEUTIC ACTIVITIES: CPT | Mod: CQ

## 2024-05-20 RX ADMIN — SENNOSIDES AND DOCUSATE SODIUM 1 TABLET: 8.6; 5 TABLET ORAL at 08:05

## 2024-05-20 RX ADMIN — LATANOPROST 1 DROP: 50 SOLUTION OPHTHALMIC at 08:05

## 2024-05-20 RX ADMIN — POLYETHYLENE GLYCOL 3350 17 G: 17 POWDER, FOR SOLUTION ORAL at 08:05

## 2024-05-20 RX ADMIN — FINASTERIDE 5 MG: 5 TABLET, FILM COATED ORAL at 08:05

## 2024-05-20 RX ADMIN — ATORVASTATIN CALCIUM 20 MG: 20 TABLET, FILM COATED ORAL at 08:05

## 2024-05-20 RX ADMIN — OXYCODONE AND ACETAMINOPHEN 2 TABLET: 10; 325 TABLET ORAL at 08:05

## 2024-05-20 RX ADMIN — OXYBUTYNIN CHLORIDE 10 MG: 5 TABLET, EXTENDED RELEASE ORAL at 08:05

## 2024-05-20 RX ADMIN — FAMOTIDINE 20 MG: 20 TABLET ORAL at 08:05

## 2024-05-20 NOTE — NURSING
Dressing changed to back per MD order.  Patient tolerated well.  No signs and symptoms of infection noted.

## 2024-05-20 NOTE — NURSING
Discharged per MD orders.  Instructions given on at home medication, follow-up appointments, activity, diet, dressing and incision care, and signs and symptoms that would warrant a return to the ED.  Verbalized understanding of all instructions given.  Patient to eat breakfast, and then notify for the need of a wheelchair.

## 2024-05-20 NOTE — PROGRESS NOTES
Afebrile ramirez  L signs stable   Pain is well controlled, ambulating independently to the bathroom   Drain 25 last shift   Incision clean dry and intact   5/5 power bilateral lower extremities with intact sensation, both calves nontender   Status post lumbar fusion   Statu  S post lumbar fusion   Discharge home with PT

## 2024-05-20 NOTE — NURSING
Report received.  Patient sitting up in bed.  AAOx4.  Respirations even and unlabored.  Call light and valuables within reach.  Bed in the lowest position and locked.  Will continue to monitor.

## 2024-05-20 NOTE — DISCHARGE SUMMARY
[facility] ([address])  Discharge Summary      Admit Date: 5/16/2024    Discharge Date and Time:  05/20/2024 7:54 AM    Attending Physician: Maverick Sullivan MD     Reason for Admission:  spinal fusion    Procedures Performed: Procedure(s) (LRB):  FUSION, SPINE, LUMBAR OPEN POSTERIOR L4-L5 (N/A)    Hospital Course (synopsis of major diagnoses, care, treatment, and services provided during the course of the hospital stay):  patient was admitted on 05/16/2024 at which time he underwent open posterior lumbar fusion at L4-5.  He tolerated the procedure well with immediate relief of leg pain.  He progressed with physical therapy.  His drain continued to put out considerable amounts until postoperative day 3. When it finally tapered off.  A 25 cc per shift it was removed.  On postoperative day 4 his incision was intact and was neurovascularly intact and he was   deemed stable and ready for discharge    Goals of Care Treatment Preferences:         Consults: none    Significant Diagnostic Studies: N/A    Final Diagnoses:    Principal Problem: <principal problem not specified>   Secondary Diagnoses: There are no hospital problems to display for this patient.     Discharged Condition: good    Disposition: Home or Self Care    Follow Up/Patient Instructions:     Medications:  Reconciled Home Medications:      Medication List        START taking these medications      cyclobenzaprine 10 MG tablet  Commonly known as: FLEXERIL  Take 1 tablet (10 mg total) by mouth 3 (three) times daily as needed for Muscle spasms.     oxyCODONE-acetaminophen  mg per tablet  Commonly known as: PERCOCET  Take 1 tablet by mouth every 4 (four) hours as needed for Pain.            CONTINUE taking these medications      atorvastatin 20 MG tablet  Commonly known as: LIPITOR  Take 20 mg by mouth once daily.     DrisdoL 50,000 unit Cap  Generic drug: ergocalciferol  Take 50,000 Units by mouth once as needed.     gabapentin 300 MG  "capsule  Commonly known as: NEURONTIN  Take 100 mg by mouth once as needed (pain).     HYZAAR 100-25 mg per tablet  Generic drug: losartan-hydrochlorothiazide 100-25 mg  Take 1 tablet by mouth once daily. 100mg     lactulose 10 gram/15 mL solution  Commonly known as: CHRONULAC  Take 10 g by mouth 2 (two) times daily as needed.     latanoprost 0.005 % ophthalmic solution  Place 1 drop into both eyes once daily.     oxybutynin 10 MG 24 hr tablet  Commonly known as: DITROPAN-XL  Take 10 mg by mouth once daily.     PROSCAR 5 mg tablet  Generic drug: finasteride  Take 5 mg by mouth once daily.            Discharge Procedure Orders   WALKER FOR HOME USE     Order Specific Question Answer Comments   Type of Walker: Adult (5'4"-6'6")    With wheels? Yes    Height: 6' 1" (1.854 m)    Weight: 88.5 kg (195 lb)    Length of need (1-99 months): 99    Please check all that apply: Patient's condition impairs ambulation.      3 IN 1 COMMODE FOR HOME USE     Order Specific Question Answer Comments   Type: Standard    Height: 6' 1" (1.854 m)    Weight: 88.5 kg (195 lb)    Length of need (1-99 months): 99      BATH/SHOWER CHAIR FOR HOME USE     Order Specific Question Answer Comments   Height: 6' 1" (1.854 m)    Weight: 88.5 kg (195 lb)    Length of need (1-99 months): 99    Type: Without back      Diet Adult Regular     Diet Adult Regular     Other restrictions (specify):   Order Comments: No bending, lifting, or driving.  Wear brace when out of bed     Notify your health care provider if you experience any of the following:  temperature >100.4     Notify your health care provider if you experience any of the following:  persistent nausea and vomiting or diarrhea     Notify your health care provider if you experience any of the following:  severe uncontrolled pain     Notify your health care provider if you experience any of the following:  redness, tenderness, or signs of infection (pain, swelling, redness, odor or green/yellow " discharge around incision site)     Notify your health care provider if you experience any of the following:  difficulty breathing or increased cough     Notify your health care provider if you experience any of the following:  severe persistent headache     Notify your health care provider if you experience any of the following:  worsening rash     Notify your health care provider if you experience any of the following:  persistent dizziness, light-headedness, or visual disturbances     Notify your health care provider if you experience any of the following:  increased confusion or weakness     Notify your health care provider if you experience any of the following:   Order Comments: Any other concerns      Remove dressing in 72 hours   Order Comments: May shower at that point     Remove dressing in 48 hours     Other restrictions (specify):   Order Comments: No bending, lifting, or driving.  Wear brace when out of bed     Notify your health care provider if you experience any of the following:  temperature >100.4     Notify your health care provider if you experience any of the following:  persistent nausea and vomiting or diarrhea     Notify your health care provider if you experience any of the following:  severe uncontrolled pain     Notify your health care provider if you experience any of the following:  redness, tenderness, or signs of infection (pain, swelling, redness, odor or green/yellow discharge around incision site)     Notify your health care provider if you experience any of the following:  difficulty breathing or increased cough     Notify your health care provider if you experience any of the following:  severe persistent headache     Notify your health care provider if you experience any of the following:  worsening rash     Notify your health care provider if you experience any of the following:  persistent dizziness, light-headedness, or visual disturbances     Notify your health care provider  if you experience any of the following:  increased confusion or weakness     Notify your health care provider if you experience any of the following:   Order Comments: Any other concerns      Follow-up Information       [facility] Follow up on 5/19/2024.    Specialties: Home Health Services, Home Therapy Services, Home Living Aide Services  Why: They will contact you for home healthcare appointments.  Contact information:  [address]  [facility] [id number] [phone]             Dme, [facility] Follow up.    Specialty: DME Provider  Why: Contact if you have any issues with your medical equipment.  Contact information:  [address]  [facility] [address]  [phone]               Maverick Sullivan MD Follow up in 2 week(s).    Specialty: Orthopedic Surgery  Contact information:  [address] [place] [id number]  [phone]

## 2024-05-20 NOTE — PLAN OF CARE
The patient remained free from injury throughout shift.  Respirations even and unlabored.  The patient denies any questions or concerns.

## 2024-05-20 NOTE — PT/OT/SLP PROGRESS
Physical Therapy Treatment    Patient Name:  Gage Hdez   MRN:  [id number]    Recommendations:     Discharge Recommendations: High Intensity Therapy  Discharge Equipment Recommendations: bedside commode, walker, rolling  Barriers to discharge:  increased caregiver burden    Assessment:     Gage Hdez is a 74 y.o. male admitted with a medical diagnosis of <principal problem not specified>.  He presents with the following impairments/functional limitations: weakness, gait instability, pain, impaired balance, impaired endurance, impaired muscle length, decreased lower extremity function, decreased ROM.    Pt eating breakfast, wanting to finish before session. Briefly discussed his progress, safety at home, use of RW and role of continued PT at home. When PTA returned to work with patient, he had been discharged.  No functional mobility performed this visit.  Rec High Intensity Therapy    Rehab Prognosis: Good; patient would benefit from acute skilled PT services to address these deficits and reach maximum level of function.    Recent Surgery: Procedure(s) (LRB):  FUSION, SPINE, LUMBAR OPEN POSTERIOR L4-L5 (N/A) 4 Days Post-Op    Plan:     During this hospitalization, patient to be seen daily to address the identified rehab impairments via gait training, therapeutic activities, therapeutic exercises, neuromuscular re-education and progress toward the following goals:    Plan of Care Expires:  05/31/24    Subjective     Chief Complaint: none stated  Patient/Family Comments/goals: I've been doing better, you wouldn't believe it.  Pain/Comfort:  Pain Rating 1: other (see comments) (pain not rated)  Location - Orientation 1: lower  Location 1: back  Pain Addressed 1: Pre-medicate for activity, Reposition, Distraction, Cessation of Activity  Pain Rating Post-Intervention 1: other (see comments) (unrated)      Objective:     Communicated with nurse Grubbs prior to session.  Patient found HOB elevated with telemetry, peripheral  IV, SCD upon PT entry to room.     General Precautions: Standard, fall  Orthopedic Precautions: spinal precautions  Braces: LSO  Respiratory Status: Room air     Functional Mobility:  No functional mobility performed      AM-PAC 6 CLICK MOBILITY  Turning over in bed (including adjusting bedclothes, sheets and blankets)?: 3  Sitting down on and standing up from a chair with arms (e.g., wheelchair, bedside commode, etc.): 2  Moving from lying on back to sitting on the side of the bed?: 3  Moving to and from a bed to a chair (including a wheelchair)?: 3  Need to walk in hospital room?: 3  Climbing 3-5 steps with a railing?: 2  Basic Mobility Total Score: 16       Treatment & Education:  Education on continued importance of PT at home, use of RW, use of LSO  Plan to return and work with pt after breakfast, but pt was discharged.    Patient left HOB elevated with all lines intact, call button in reach, and spouse present..    GOALS:   Multidisciplinary Problems       Physical Therapy Goals          Problem: Physical Therapy    Goal Priority Disciplines Outcome Goal Variances Interventions   Physical Therapy Goal     PT, PT/OT Progressing     Description: PT goals to be met in 2 weeks as follows:   1. Mod I Bed mobility  2. Mod I T/F  3. Gait 150' Mod I with LRAD  4. Tolerate OOB x 2 hours                       Time Tracking:     PT Received On: 05/20/24  PT Start Time: 0858     PT Stop Time: 0906  PT Total Time (min): 8 min     Billable Minutes: Therapeutic Activity 8    Treatment Type: Treatment  PT/PTA: PTA     Number of PTA visits since last PT visit: 2     05/20/2024

## (undated) DEVICE — SUT MCRYL PLUS 4-0 PS2 27IN

## (undated) DEVICE — Device

## (undated) DEVICE — GLOVE BIOGEL SKINSENSE PI 7.0

## (undated) DEVICE — TUBING SUC UNIV W/CONN 12FT

## (undated) DEVICE — DRAPE INCISE IOBAN 2 23X17IN

## (undated) DEVICE — PROBE PRASS SLIM

## (undated) DEVICE — SPONGE NEURO 1/2 X 2

## (undated) DEVICE — POSITIONER IV ARMBOARD FOAM

## (undated) DEVICE — ITEM INACTIVATED - ERP

## (undated) DEVICE — DRAPE C-ARMOR EQUIPMENT COVER

## (undated) DEVICE — UNDERGLOVES BIOGEL PI SIZE 8.5

## (undated) DEVICE — ELECTRODE BLD EXT INSUL 1

## (undated) DEVICE — SEALER AQUAMANTYS 2.3 BIPOLAR

## (undated) DEVICE — SUT VICRYL+ 1 CT1 18IN

## (undated) DEVICE — BUR BONE CUT MICRO TPS 3X3.8MM

## (undated) DEVICE — SEALER BIPOLAR TISSUE 6.0

## (undated) DEVICE — STAPLER SKIN ROTATING HEAD

## (undated) DEVICE — ADHESIVE MASTISOL VIAL 48/BX

## (undated) DEVICE — DRAPE THREE-QTR REINF 53X77IN

## (undated) DEVICE — SUT VICRYL PLUS 2-0 CT1 18

## (undated) DEVICE — STRIP MEDI WND CLSR 1/2X4IN

## (undated) DEVICE — GLOVE BIOGEL SKINSENSE PI 8.0

## (undated) DEVICE — SPONGE COTTON TRAY 4X4IN

## (undated) DEVICE — DRAPE T TRNSVRS LAP 102X78X121

## (undated) DEVICE — TOWEL OR DISP STRL BLUE 4/PK

## (undated) DEVICE — SUT VICRYL PLUS ANTIBACT

## (undated) DEVICE — COVER SNAP 36IN X 30IN

## (undated) DEVICE — SOL NACL .9P 500ML

## (undated) DEVICE — SOL IRR SOD CHL .9% POUR

## (undated) DEVICE — SPONGE SURGIFOAM 100 8.5X12X10

## (undated) DEVICE — DRAPE SURG W/TWL 17 5/8X23

## (undated) DEVICE — SOL NACL IRR 3000ML

## (undated) DEVICE — DURAPREP SURG SCRUB 26ML

## (undated) DEVICE — SUT BONE WAX 2.5 GRMS 12/BX

## (undated) DEVICE — PAD ABDOMINAL STERILE 8X10IN

## (undated) DEVICE — GOWN SMART IMP BREATHABLE XXLG

## (undated) DEVICE — COVER HD BACK TABLE 6FT

## (undated) DEVICE — UNDERGLOVE BIOGEL PI SZ 6.5 LF

## (undated) DEVICE — DRESSING N ADH OIL EMUL 3X3